# Patient Record
Sex: MALE | Race: WHITE
[De-identification: names, ages, dates, MRNs, and addresses within clinical notes are randomized per-mention and may not be internally consistent; named-entity substitution may affect disease eponyms.]

---

## 2018-05-03 ENCOUNTER — HOSPITAL ENCOUNTER (OUTPATIENT)
Dept: HOSPITAL 84 - D.SP | Age: 28
Discharge: HOME | End: 2018-05-03
Attending: INTERNAL MEDICINE
Payer: MEDICAID

## 2018-05-03 VITALS — HEIGHT: 69 IN | BODY MASS INDEX: 31.61 KG/M2 | BODY MASS INDEX: 31.61 KG/M2 | WEIGHT: 213.45 LBS

## 2018-05-03 VITALS — DIASTOLIC BLOOD PRESSURE: 92 MMHG | SYSTOLIC BLOOD PRESSURE: 171 MMHG

## 2018-05-03 DIAGNOSIS — Z01.812: ICD-10-CM

## 2018-05-03 DIAGNOSIS — N04.9: ICD-10-CM

## 2018-05-03 DIAGNOSIS — R80.9: Primary | ICD-10-CM

## 2018-05-03 LAB
ANION GAP SERPL CALC-SCNC: 10.2 MMOL/L (ref 8–16)
APTT BLD: 28 SECONDS (ref 22.8–39.4)
BASOPHILS NFR BLD AUTO: 0.3 % (ref 0–2)
BUN SERPL-MCNC: 34 MG/DL (ref 7–18)
CALCIUM SERPL-MCNC: 7.5 MG/DL (ref 8.5–10.1)
CHLORIDE SERPL-SCNC: 111 MMOL/L (ref 98–107)
CO2 SERPL-SCNC: 23.6 MMOL/L (ref 21–32)
CREAT SERPL-MCNC: 1.7 MG/DL (ref 0.6–1.3)
EOSINOPHIL NFR BLD: 0.5 % (ref 0–7)
ERYTHROCYTE [DISTWIDTH] IN BLOOD BY AUTOMATED COUNT: 14.3 % (ref 11.5–14.5)
GLUCOSE SERPL-MCNC: 80 MG/DL (ref 74–106)
HCT VFR BLD CALC: 39.8 % (ref 42–54)
HGB BLD-MCNC: 12.9 G/DL (ref 13.5–17.5)
IMM GRANULOCYTES NFR BLD: 0.7 % (ref 0–5)
INR PPP: 0.85 (ref 0.85–1.17)
LYMPHOCYTES NFR BLD AUTO: 28.4 % (ref 15–50)
MCH RBC QN AUTO: 29.4 PG (ref 26–34)
MCHC RBC AUTO-ENTMCNC: 32.4 G/DL (ref 31–37)
MCV RBC: 90.7 FL (ref 80–100)
MONOCYTES NFR BLD: 7.4 % (ref 2–11)
NEUTROPHILS NFR BLD AUTO: 62.7 % (ref 40–80)
OSMOLALITY SERPL CALC.SUM OF ELEC: 287 MOSM/KG (ref 275–300)
PLATELET # BLD: 235 10X3/UL (ref 130–400)
PMV BLD AUTO: 10.3 FL (ref 7.4–10.4)
POTASSIUM SERPL-SCNC: 3.8 MMOL/L (ref 3.5–5.1)
PROTHROMBIN TIME: 11.3 SECONDS (ref 11.6–15)
RBC # BLD AUTO: 4.39 10X6/UL (ref 4.2–6.1)
SODIUM SERPL-SCNC: 141 MMOL/L (ref 136–145)
WBC # BLD AUTO: 10.9 10X3/UL (ref 4.8–10.8)

## 2018-06-13 ENCOUNTER — HOSPITAL ENCOUNTER (OUTPATIENT)
Dept: HOSPITAL 84 - D.SP | Age: 28
LOS: 1 days | Discharge: HOME | End: 2018-06-14
Attending: INTERNAL MEDICINE
Payer: MEDICAID

## 2018-06-13 VITALS
WEIGHT: 187.89 LBS | BODY MASS INDEX: 27.83 KG/M2 | HEIGHT: 69 IN | WEIGHT: 187.89 LBS | BODY MASS INDEX: 27.83 KG/M2 | BODY MASS INDEX: 27.83 KG/M2 | HEIGHT: 69 IN

## 2018-06-13 VITALS — DIASTOLIC BLOOD PRESSURE: 78 MMHG | SYSTOLIC BLOOD PRESSURE: 122 MMHG

## 2018-06-13 VITALS — SYSTOLIC BLOOD PRESSURE: 139 MMHG | DIASTOLIC BLOOD PRESSURE: 81 MMHG

## 2018-06-13 VITALS — SYSTOLIC BLOOD PRESSURE: 134 MMHG | DIASTOLIC BLOOD PRESSURE: 84 MMHG

## 2018-06-13 VITALS — SYSTOLIC BLOOD PRESSURE: 107 MMHG | DIASTOLIC BLOOD PRESSURE: 65 MMHG

## 2018-06-13 VITALS — SYSTOLIC BLOOD PRESSURE: 134 MMHG | DIASTOLIC BLOOD PRESSURE: 80 MMHG

## 2018-06-13 VITALS — SYSTOLIC BLOOD PRESSURE: 130 MMHG | DIASTOLIC BLOOD PRESSURE: 79 MMHG

## 2018-06-13 VITALS — SYSTOLIC BLOOD PRESSURE: 150 MMHG | DIASTOLIC BLOOD PRESSURE: 90 MMHG

## 2018-06-13 VITALS — SYSTOLIC BLOOD PRESSURE: 125 MMHG | DIASTOLIC BLOOD PRESSURE: 72 MMHG

## 2018-06-13 DIAGNOSIS — N04.9: Primary | ICD-10-CM

## 2018-06-13 DIAGNOSIS — I10: ICD-10-CM

## 2018-06-13 DIAGNOSIS — R80.9: ICD-10-CM

## 2018-06-13 DIAGNOSIS — Z01.812: ICD-10-CM

## 2018-06-13 LAB
ANION GAP SERPL CALC-SCNC: 9.6 MMOL/L (ref 8–16)
APTT BLD: 26.8 SECONDS (ref 22.8–39.4)
BASOPHILS NFR BLD AUTO: 0.1 % (ref 0–2)
BASOPHILS NFR BLD AUTO: 0.2 % (ref 0–2)
BASOPHILS NFR BLD AUTO: 0.3 % (ref 0–2)
BUN SERPL-MCNC: 51 MG/DL (ref 7–18)
CALCIUM SERPL-MCNC: 7.7 MG/DL (ref 8.5–10.1)
CHLORIDE SERPL-SCNC: 109 MMOL/L (ref 98–107)
CO2 SERPL-SCNC: 27.2 MMOL/L (ref 21–32)
CREAT SERPL-MCNC: 3 MG/DL (ref 0.6–1.3)
EOSINOPHIL NFR BLD: 0.1 % (ref 0–7)
EOSINOPHIL NFR BLD: 0.3 % (ref 0–7)
EOSINOPHIL NFR BLD: 0.5 % (ref 0–7)
ERYTHROCYTE [DISTWIDTH] IN BLOOD BY AUTOMATED COUNT: 14.5 % (ref 11.5–14.5)
ERYTHROCYTE [DISTWIDTH] IN BLOOD BY AUTOMATED COUNT: 14.5 % (ref 11.5–14.5)
ERYTHROCYTE [DISTWIDTH] IN BLOOD BY AUTOMATED COUNT: 14.8 % (ref 11.5–14.5)
GLUCOSE SERPL-MCNC: 83 MG/DL (ref 74–106)
HCT VFR BLD CALC: 33.6 % (ref 42–54)
HCT VFR BLD CALC: 33.8 % (ref 42–54)
HCT VFR BLD CALC: 36.1 % (ref 42–54)
HGB BLD-MCNC: 10.9 G/DL (ref 13.5–17.5)
HGB BLD-MCNC: 11 G/DL (ref 13.5–17.5)
HGB BLD-MCNC: 11.7 G/DL (ref 13.5–17.5)
IMM GRANULOCYTES NFR BLD: 0.9 % (ref 0–5)
INR PPP: 0.81 (ref 0.85–1.17)
LYMPHOCYTES NFR BLD AUTO: 10 % (ref 15–50)
LYMPHOCYTES NFR BLD AUTO: 20.7 % (ref 15–50)
LYMPHOCYTES NFR BLD AUTO: 27.1 % (ref 15–50)
MCH RBC QN AUTO: 28.9 PG (ref 26–34)
MCH RBC QN AUTO: 29 PG (ref 26–34)
MCH RBC QN AUTO: 29.3 PG (ref 26–34)
MCHC RBC AUTO-ENTMCNC: 32.2 G/DL (ref 31–37)
MCHC RBC AUTO-ENTMCNC: 32.4 G/DL (ref 31–37)
MCHC RBC AUTO-ENTMCNC: 32.7 G/DL (ref 31–37)
MCV RBC: 89.4 FL (ref 80–100)
MCV RBC: 89.4 FL (ref 80–100)
MCV RBC: 89.7 FL (ref 80–100)
MONOCYTES NFR BLD: 3 % (ref 2–11)
MONOCYTES NFR BLD: 7.1 % (ref 2–11)
MONOCYTES NFR BLD: 7.6 % (ref 2–11)
NEUTROPHILS NFR BLD AUTO: 63.6 % (ref 40–80)
NEUTROPHILS NFR BLD AUTO: 70.8 % (ref 40–80)
NEUTROPHILS NFR BLD AUTO: 85.9 % (ref 40–80)
OSMOLALITY SERPL CALC.SUM OF ELEC: 295 MOSM/KG (ref 275–300)
PLATELET # BLD: 230 10X3/UL (ref 130–400)
PLATELET # BLD: 252 10X3/UL (ref 130–400)
PLATELET # BLD: 274 10X3/UL (ref 130–400)
PMV BLD AUTO: 9.4 FL (ref 7.4–10.4)
PMV BLD AUTO: 9.5 FL (ref 7.4–10.4)
PMV BLD AUTO: 9.8 FL (ref 7.4–10.4)
POTASSIUM SERPL-SCNC: 3.8 MMOL/L (ref 3.5–5.1)
PROTHROMBIN TIME: 10.9 SECONDS (ref 11.6–15)
RBC # BLD AUTO: 3.76 10X6/UL (ref 4.2–6.1)
RBC # BLD AUTO: 3.77 10X6/UL (ref 4.2–6.1)
RBC # BLD AUTO: 4.04 10X6/UL (ref 4.2–6.1)
SODIUM SERPL-SCNC: 142 MMOL/L (ref 136–145)
WBC # BLD AUTO: 10.6 10X3/UL (ref 4.8–10.8)
WBC # BLD AUTO: 10.6 10X3/UL (ref 4.8–10.8)
WBC # BLD AUTO: 12.4 10X3/UL (ref 4.8–10.8)

## 2018-06-14 VITALS — SYSTOLIC BLOOD PRESSURE: 117 MMHG | DIASTOLIC BLOOD PRESSURE: 74 MMHG

## 2018-06-14 VITALS — SYSTOLIC BLOOD PRESSURE: 132 MMHG | DIASTOLIC BLOOD PRESSURE: 84 MMHG

## 2018-06-14 LAB
ANION GAP SERPL CALC-SCNC: 7.9 MMOL/L (ref 8–16)
BASOPHILS NFR BLD AUTO: 0.1 % (ref 0–2)
BASOPHILS NFR BLD AUTO: 0.2 % (ref 0–2)
BUN SERPL-MCNC: 56 MG/DL (ref 7–18)
CALCIUM SERPL-MCNC: 7.7 MG/DL (ref 8.5–10.1)
CHLORIDE SERPL-SCNC: 109 MMOL/L (ref 98–107)
CO2 SERPL-SCNC: 27.9 MMOL/L (ref 21–32)
CREAT SERPL-MCNC: 3.2 MG/DL (ref 0.6–1.3)
EOSINOPHIL NFR BLD: 0 % (ref 0–7)
EOSINOPHIL NFR BLD: 0.2 % (ref 0–7)
ERYTHROCYTE [DISTWIDTH] IN BLOOD BY AUTOMATED COUNT: 14.4 % (ref 11.5–14.5)
ERYTHROCYTE [DISTWIDTH] IN BLOOD BY AUTOMATED COUNT: 14.5 % (ref 11.5–14.5)
GLUCOSE SERPL-MCNC: 114 MG/DL (ref 74–106)
HCT VFR BLD CALC: 31.6 % (ref 42–54)
HCT VFR BLD CALC: 33.6 % (ref 42–54)
HGB BLD-MCNC: 10.2 G/DL (ref 13.5–17.5)
HGB BLD-MCNC: 11 G/DL (ref 13.5–17.5)
IMM GRANULOCYTES NFR BLD: 0.6 % (ref 0–5)
IMM GRANULOCYTES NFR BLD: 0.6 % (ref 0–5)
LYMPHOCYTES NFR BLD AUTO: 13.2 % (ref 15–50)
LYMPHOCYTES NFR BLD AUTO: 15.3 % (ref 15–50)
MCH RBC QN AUTO: 29 PG (ref 26–34)
MCH RBC QN AUTO: 29.3 PG (ref 26–34)
MCHC RBC AUTO-ENTMCNC: 32.3 G/DL (ref 31–37)
MCHC RBC AUTO-ENTMCNC: 32.7 G/DL (ref 31–37)
MCV RBC: 89.4 FL (ref 80–100)
MCV RBC: 89.8 FL (ref 80–100)
MONOCYTES NFR BLD: 7 % (ref 2–11)
MONOCYTES NFR BLD: 7.4 % (ref 2–11)
NEUTROPHILS NFR BLD AUTO: 76.4 % (ref 40–80)
NEUTROPHILS NFR BLD AUTO: 79 % (ref 40–80)
OSMOLALITY SERPL CALC.SUM OF ELEC: 295 MOSM/KG (ref 275–300)
PLATELET # BLD: 246 10X3/UL (ref 130–400)
PLATELET # BLD: 249 10X3/UL (ref 130–400)
PMV BLD AUTO: 9.5 FL (ref 7.4–10.4)
PMV BLD AUTO: 9.9 FL (ref 7.4–10.4)
POTASSIUM SERPL-SCNC: 4.8 MMOL/L (ref 3.5–5.1)
RBC # BLD AUTO: 3.52 10X6/UL (ref 4.2–6.1)
RBC # BLD AUTO: 3.76 10X6/UL (ref 4.2–6.1)
SODIUM SERPL-SCNC: 140 MMOL/L (ref 136–145)
WBC # BLD AUTO: 11.1 10X3/UL (ref 4.8–10.8)
WBC # BLD AUTO: 14.3 10X3/UL (ref 4.8–10.8)

## 2019-01-28 ENCOUNTER — HOSPITAL ENCOUNTER (INPATIENT)
Dept: HOSPITAL 84 - D.M2 | Age: 29
LOS: 9 days | Discharge: HOME HEALTH | DRG: 674 | End: 2019-02-06
Attending: INTERNAL MEDICINE | Admitting: INTERNAL MEDICINE
Payer: MEDICAID

## 2019-01-28 VITALS
BODY MASS INDEX: 27.74 KG/M2 | WEIGHT: 187.29 LBS | BODY MASS INDEX: 27.74 KG/M2 | HEIGHT: 69 IN | BODY MASS INDEX: 27.74 KG/M2 | WEIGHT: 187.29 LBS | HEIGHT: 69 IN

## 2019-01-28 DIAGNOSIS — N18.5: ICD-10-CM

## 2019-01-28 DIAGNOSIS — D63.1: ICD-10-CM

## 2019-01-28 DIAGNOSIS — I12.0: Primary | ICD-10-CM

## 2019-01-28 DIAGNOSIS — R33.9: ICD-10-CM

## 2019-01-28 DIAGNOSIS — K42.9: ICD-10-CM

## 2019-01-28 DIAGNOSIS — E83.39: ICD-10-CM

## 2019-01-28 LAB
ALBUMIN SERPL-MCNC: 0.7 G/DL (ref 3.4–5)
ALP SERPL-CCNC: 50 U/L (ref 46–116)
ALT SERPL-CCNC: 26 U/L (ref 10–68)
ANION GAP SERPL CALC-SCNC: 13.8 MMOL/L (ref 8–16)
BASOPHILS NFR BLD AUTO: 0.4 % (ref 0–2)
BILIRUB SERPL-MCNC: 0.27 MG/DL (ref 0.2–1.3)
BUN SERPL-MCNC: 57 MG/DL (ref 7–18)
CALCIUM SERPL-MCNC: 7 MG/DL (ref 8.5–10.1)
CHLORIDE SERPL-SCNC: 109 MMOL/L (ref 98–107)
CO2 SERPL-SCNC: 22.9 MMOL/L (ref 21–32)
CREAT SERPL-MCNC: 5.2 MG/DL (ref 0.6–1.3)
EOSINOPHIL NFR BLD: 0.3 % (ref 0–7)
ERYTHROCYTE [DISTWIDTH] IN BLOOD BY AUTOMATED COUNT: 13.9 % (ref 11.5–14.5)
GLOBULIN SER-MCNC: 3.3 G/L
GLUCOSE SERPL-MCNC: 106 MG/DL (ref 74–106)
HCT VFR BLD CALC: 34.4 % (ref 42–54)
HGB BLD-MCNC: 11 G/DL (ref 13.5–17.5)
IMM GRANULOCYTES NFR BLD: 0.5 % (ref 0–5)
INR PPP: 0.83 (ref 0.85–1.17)
LYMPHOCYTES NFR BLD AUTO: 12.4 % (ref 15–50)
MCH RBC QN AUTO: 29.7 PG (ref 26–34)
MCHC RBC AUTO-ENTMCNC: 32 G/DL (ref 31–37)
MCV RBC: 93 FL (ref 80–100)
MONOCYTES NFR BLD: 5.1 % (ref 2–11)
NEUTROPHILS NFR BLD AUTO: 81.3 % (ref 40–80)
OSMOLALITY SERPL CALC.SUM OF ELEC: 296 MOSM/KG (ref 275–300)
PLATELET # BLD: 210 10X3/UL (ref 130–400)
PMV BLD AUTO: 9.4 FL (ref 7.4–10.4)
POTASSIUM SERPL-SCNC: 4.7 MMOL/L (ref 3.5–5.1)
PROT SERPL-MCNC: 4 G/DL (ref 6.4–8.2)
PROTHROMBIN TIME: 11 SECONDS (ref 11.6–15)
RBC # BLD AUTO: 3.7 10X6/UL (ref 4.2–6.1)
SODIUM SERPL-SCNC: 141 MMOL/L (ref 136–145)
WBC # BLD AUTO: 8 10X3/UL (ref 4.8–10.8)

## 2019-01-29 VITALS — DIASTOLIC BLOOD PRESSURE: 84 MMHG | SYSTOLIC BLOOD PRESSURE: 136 MMHG

## 2019-01-29 VITALS — DIASTOLIC BLOOD PRESSURE: 83 MMHG | SYSTOLIC BLOOD PRESSURE: 128 MMHG

## 2019-01-29 VITALS — DIASTOLIC BLOOD PRESSURE: 92 MMHG | SYSTOLIC BLOOD PRESSURE: 144 MMHG

## 2019-01-29 VITALS — DIASTOLIC BLOOD PRESSURE: 89 MMHG | SYSTOLIC BLOOD PRESSURE: 129 MMHG

## 2019-01-29 VITALS — SYSTOLIC BLOOD PRESSURE: 140 MMHG | DIASTOLIC BLOOD PRESSURE: 94 MMHG

## 2019-01-29 VITALS — DIASTOLIC BLOOD PRESSURE: 88 MMHG | SYSTOLIC BLOOD PRESSURE: 123 MMHG

## 2019-01-29 VITALS — DIASTOLIC BLOOD PRESSURE: 77 MMHG | SYSTOLIC BLOOD PRESSURE: 126 MMHG

## 2019-01-29 PROCEDURE — 0WUF0JZ SUPPLEMENT ABDOMINAL WALL WITH SYNTHETIC SUBSTITUTE, OPEN APPROACH: ICD-10-PCS | Performed by: SURGERY

## 2019-01-29 PROCEDURE — 03180ZF BYPASS LEFT BRACHIAL ARTERY TO LOWER ARM VEIN, OPEN APPROACH: ICD-10-PCS | Performed by: SURGERY

## 2019-01-29 NOTE — NUR
RECEIVED REPORT ON PATIENT WHO IS NPO FOR SURGERY THIS AM. DR MEMBRENO IN TO
SEE PATIENT. NEW ORDERS RECEIVED. PATIENT HAS BEEN NPO SINCE MIDNIGHT. ON ROOM
AIR. LEFT FA PIV SEEN WITH SALINE LOCK. PARENTS AT BEDSIDE. WILL MONITOR.

## 2019-01-29 NOTE — NUR
PATIENT RESTING IN BED. RESPIRATIONS ARE EVEN AND UNLABORED. NO S/S OF
DISTRESS. NO C/O PAIN. CALL LIGHT WITHIN REACH. WILL CPOC.

## 2019-01-29 NOTE — NUR
RECEIVED BACK TO ROOM WITH SLING TO LEFT ARM. COMPRESSED JACQUES DRAIN SEEN TO
ABDOMINAL AREA. IV OF NS INFUSING PER GRAVITY TO RIGHT HAND.

## 2019-01-29 NOTE — NUR
AS PER DR MEMBRENO NOTE, PATIENT IS TO RESUME HIS LOVENOX PRIOR TO ADMISSION
BUT I DO NOT SEE IT ON THE [facility] RECONCILATION LIST. PATIENT WAS TAKING
HEPARIN AT HOME AND THIS WAS VERIFIED BY MYSELF WITH THE PATIENT AND THE
PARENTS.

## 2019-01-29 NOTE — NUR
WE ARE TO CONTINUE HOME MEDS BUT HOLD THE POTASSIUM AND METOLZONE AND XARELTO
FOR NOW PER LAZARUS BULL.

## 2019-01-29 NOTE — NUR
RESUMING CARE. PT LAYING IN BED, IV RT HAND SL , LEFT AVF DONE TODAY RESERVED
LEFT ARM PT HAD OPEN UMBILICAL HERNIA JACQUES DRAIN IN PLACE PT C/O OF PAIN IN
ABDOMEN ADVISED PT WE WOULD CALL TO GET SOMETHING FOR PAIN CL IN REACH WILL
CONT TO MONITOR

## 2019-01-30 VITALS — SYSTOLIC BLOOD PRESSURE: 138 MMHG | DIASTOLIC BLOOD PRESSURE: 96 MMHG

## 2019-01-30 VITALS — DIASTOLIC BLOOD PRESSURE: 99 MMHG | SYSTOLIC BLOOD PRESSURE: 144 MMHG

## 2019-01-30 VITALS — DIASTOLIC BLOOD PRESSURE: 81 MMHG | SYSTOLIC BLOOD PRESSURE: 134 MMHG

## 2019-01-30 VITALS — SYSTOLIC BLOOD PRESSURE: 143 MMHG | DIASTOLIC BLOOD PRESSURE: 91 MMHG

## 2019-01-30 VITALS — SYSTOLIC BLOOD PRESSURE: 130 MMHG | DIASTOLIC BLOOD PRESSURE: 84 MMHG

## 2019-01-30 VITALS — DIASTOLIC BLOOD PRESSURE: 99 MMHG | SYSTOLIC BLOOD PRESSURE: 151 MMHG

## 2019-01-30 NOTE — NUR
CALLED TO ROOM WITH COMPLAINT OF NOT VOIDING. BLADDER SCAN DONE WITH RESIDUAL
 ML. CALL PLACED TO LAZARUS BULL FOR ANY FURTHER ORDERS.

## 2019-01-30 NOTE — NUR
PT HAS NOT VOIDING SINCE SURGERY , HAD GARCÍA MIJARES PAGED, I ADVISED I BLADDER
SCANNED PT AND HE WAS HOLDING 877ML SE ADVISED TO DO AN IN AND OUT CATH. AND
GIVE PTS 0900 FLOMAX, IN AND OUT DONE FLOMAX GIVEN TOLERATED WELL WILL CONT
TO MONITOR

## 2019-01-30 NOTE — NUR
RESUMING PATIENT CARE. PATIENT IS ALERT AND ORIENTED. PATIENT IS STANDING AT
SIDE OF BED. DENIES NEEDS. NO S/S OF DISTRESS. NO C/O PAIN. CALL LIGHT WITHIN
REACH. WILL CPOC.

## 2019-01-30 NOTE — NUR
AVTAR NI RN CHARGE TO SPEAK TO LAZARUS HULL R/T PATIENT NOT VOIDING
YET. NO NEW FURTHER ORDERS AT THIS TIME.

## 2019-01-30 NOTE — NUR
ROUNDING DONE WITH PATIENT INFORMED THAT HE IS NEEDING TO GET OUT OF BED FOR
BREAKFAST THIS AM. LEFT ARM IS IN SLING AT THIS TIME. JACQUES DRAIN SEEN TO LEFT
ABDOMINAL AREA WITH C/D/I DRESSING TO UMBILICAL AREA. DRAIN IS COMPRESSED.
FAMILY AT BEDSIDE. SALINE LOCK PIV SEEN TO RIGHT HAND.

## 2019-01-31 VITALS — SYSTOLIC BLOOD PRESSURE: 130 MMHG | DIASTOLIC BLOOD PRESSURE: 75 MMHG

## 2019-01-31 VITALS — DIASTOLIC BLOOD PRESSURE: 74 MMHG | SYSTOLIC BLOOD PRESSURE: 128 MMHG

## 2019-01-31 VITALS — DIASTOLIC BLOOD PRESSURE: 72 MMHG | SYSTOLIC BLOOD PRESSURE: 141 MMHG

## 2019-01-31 VITALS — DIASTOLIC BLOOD PRESSURE: 54 MMHG | SYSTOLIC BLOOD PRESSURE: 142 MMHG

## 2019-01-31 VITALS — SYSTOLIC BLOOD PRESSURE: 121 MMHG | DIASTOLIC BLOOD PRESSURE: 80 MMHG

## 2019-01-31 VITALS — DIASTOLIC BLOOD PRESSURE: 90 MMHG | SYSTOLIC BLOOD PRESSURE: 148 MMHG

## 2019-01-31 VITALS — DIASTOLIC BLOOD PRESSURE: 79 MMHG | SYSTOLIC BLOOD PRESSURE: 132 MMHG

## 2019-01-31 LAB
ALBUMIN SERPL-MCNC: 0.9 G/DL (ref 3.4–5)
ALP SERPL-CCNC: 48 U/L (ref 46–116)
ALT SERPL-CCNC: 13 U/L (ref 10–68)
ANION GAP SERPL CALC-SCNC: 17.5 MMOL/L (ref 8–16)
BASOPHILS NFR BLD AUTO: 0.3 % (ref 0–2)
BILIRUB SERPL-MCNC: 0.23 MG/DL (ref 0.2–1.3)
BUN SERPL-MCNC: 76 MG/DL (ref 7–18)
CALCIUM SERPL-MCNC: 7.7 MG/DL (ref 8.5–10.1)
CHLORIDE SERPL-SCNC: 106 MMOL/L (ref 98–107)
CO2 SERPL-SCNC: 19.9 MMOL/L (ref 21–32)
CREAT SERPL-MCNC: 7 MG/DL (ref 0.6–1.3)
EOSINOPHIL NFR BLD: 0.5 % (ref 0–7)
ERYTHROCYTE [DISTWIDTH] IN BLOOD BY AUTOMATED COUNT: 14.5 % (ref 11.5–14.5)
GLOBULIN SER-MCNC: 3.6 G/L
GLUCOSE SERPL-MCNC: 94 MG/DL (ref 74–106)
HCT VFR BLD CALC: 35.3 % (ref 42–54)
HGB BLD-MCNC: 11 G/DL (ref 13.5–17.5)
IMM GRANULOCYTES NFR BLD: 1.1 % (ref 0–5)
LYMPHOCYTES NFR BLD AUTO: 13.1 % (ref 15–50)
MAGNESIUM SERPL-MCNC: 3.1 MG/DL (ref 1.8–2.4)
MCH RBC QN AUTO: 29.5 PG (ref 26–34)
MCHC RBC AUTO-ENTMCNC: 31.2 G/DL (ref 31–37)
MCV RBC: 94.6 FL (ref 80–100)
MONOCYTES NFR BLD: 7 % (ref 2–11)
NEUTROPHILS NFR BLD AUTO: 78 % (ref 40–80)
OSMOLALITY SERPL CALC.SUM OF ELEC: 300 MOSM/KG (ref 275–300)
PHOSPHATE SERPL-MCNC: 10.8 MG/DL (ref 2.5–4.9)
PLATELET # BLD: 222 10X3/UL (ref 130–400)
PMV BLD AUTO: 9.8 FL (ref 7.4–10.4)
POTASSIUM SERPL-SCNC: 4.4 MMOL/L (ref 3.5–5.1)
PROT SERPL-MCNC: 4.5 G/DL (ref 6.4–8.2)
RBC # BLD AUTO: 3.73 10X6/UL (ref 4.2–6.1)
SODIUM SERPL-SCNC: 139 MMOL/L (ref 136–145)
WBC # BLD AUTO: 11.3 10X3/UL (ref 4.8–10.8)

## 2019-01-31 NOTE — NUR
ASSESSMENT COMPLETED. ALERT AND ORIENTED. LEFT ARM RESERVED.  RIGHT HAND SL.
DRSG TO LOWER ABD WITH DRAIN. MARSH CATH TO GRAVITY. UP AB [facility]IER.WILL MONITOR

## 2019-01-31 NOTE — MORECARE
CASE MANAGEMENT DISCHARGE SUMMARY
 
 
PATIENT: MARU ASHBY              UNIT: [id number]
ACCOUNT#: [id number]                       ADM DATE: 19
AGE: 28     : [birth year]  SEX: M            ROOM/BED: D.2132    
AUTHOR: MAYELIN CRUZ                             PHYSICIAN:                               
 
REFERRING PHYSICIAN: VINICIUS BETHEA MD                  
DATE OF SERVICE: 19
Discharge Plan
 
 
Patient Name: MARU ASHBY
Facility: [facility]:[place]
Encounter #: [id number]
Medical Record #: [id number]
: 1990
Planned Disposition: Home with Home Health
Anticipated Discharge Date: 19
 
Discharge Date: 
Expected LOS: 4
Initial Reviewer: [id number]
Initial Review Date: 2019
Generated: 19   6:30 pm 
Comments
 
DCP- Discharge Planning
 
Updated by [email or web address]: Carl Bojorquez on 19   4:22 pm CT
Patient Name: MARU ASHBY                                     
Admission Status: Elective   
Accout number: [id number]                              
Admission Date: 2019   
: 1990                                                        
Admission Diagnosis:   
Attending: VINICIUS BETHEA                                                
Current LOS:  3   
  
Anticipated DC Date: 2019   
Planned Disposition: Home with Home Health   
Primary Insurance: [employer] [place] PRIVATE OPTIONS COD[place]   
PLANNED EXTERNAL PROVIDER:  [facility]  
  
Discharge Planning Comments:   
CM RECEIVED HOME HEALTH ORDER, MET WITH PT IN ROOM TO DISCUSS DISCHARGE 
PLANNING AND NEEDS.  PT REPORTS LIVING AT HOME INDEPENDENTLY WITH PARENTS AND 
ADULT SISTER.   PT HAS BLOOD PRESSURE MONITOR AND NO MEDICAL EQUIPMENT  
PROVIDER PREFERENCE.  PT HAS NO OUTSIDE SERVICES ASSISTING IN THE HOME. PT IS 
 
NOT YET DOING OUTPATIENT DIALYSIS, BUT HAD A FISTULA  CREATED FOR WHEN HE MAY 
NEED IT.  CM DISCUSSED AVAILABILITY OF HOME HEALTH, REHAB SERVICES AND 
MEDICAL EQUIPMENT. PT WILL ACCEPT HOME HEALTH, REPORTS H IS SISTER TO BE A 
TEACHABLE CAREGIVER IN HOME.  PT REPORTS HIS PARENTS WILL PICK HIM UP FOR 
DISCHARGE HOME TOMORROW. PT HAS NO PREFERENCE ON HOME HEALTH PROVIDER, CHOICE 
LETTER SIGNED.  PT DENIES FURTHER DISCHARGE NEEDS.  
  
CM SPOKE TO OCTAVIANO OF [facility], DISCUSSED REFERRAL.  OCTAVIANO THINKS PT MAY BE WITHIN 
THE SERVICE AREA FOR [facility].  CM CALLED [facility], [phone], SPOKE TO VENTURA 
AND PROVIDED REFERRAL INFORMATION.  CM FAXED REFERRAL FOR HOME HEALTH TO [facility] [phone].  
  
CM WAITING ACCEPTANCE DETERMINATION FROM [facility].  
  
: Carl Bojorquez  
  
Appended by Carl Bojorquez on 2019 17:22 CST:  
CM RECEIVED CALL FROM VENTURA OF [facility], THEY WILL ACCEPT PT AND 
PLAN FOR HOME HEALTH ADMISSION ON SATURDAY, 19.  
  
FOR DISCHARGE, NOTIFY [facility] [phone].  FAX DISCHARGE 
INFORMATION TO [facility] [phone].   
  
CARL BOJORQUEZ, CASE MANAGEMENT
 DCPIA - Discharge Planning Initial Assessment
 
Updated by [id number]: Carl Bojorquez on 19   1:37 pm
*  Is the patient Alert and Oriented?
Yes
*  How many steps to enter\exit or inside your home? NONE *  PCP VINICIUS BETHEA *  Pharmacy
[facility]
*  Preadmission Environment
Home with Family
*  ADLs
Independent
*  Equipment
Other
*  Other Equipment
BLOOD PRESSURE CUFF  NO MEDICAL EQUIPMENT PROVIDER PREFERENCE
 
*  List name and contact numbers for known caregivers / representatives who 
currently or will assist patient after discharge:
BAR ASHBY, MOTHER, [phone]
*  Verbal permission to speak to the caregivers and representatives has been 
obtained from the patient.
Yes
*  Community resources currently utilized
None
*  Please name any agencies selected above.
NONE
*  Additional services required to return to the preadmission environment?
Yes
 
*  Can the patient safely return to the preadmission environment?
Yes
*  Has this patient been hospitalized within the prior 30 days at any 
hospital?
No
 
 
 
 
 
Coverage Notice
 
Reviewer: [email or web address] - Carl Bojorquez
 
Notice Issued Date-Time: 2019  11:20
Notice Type: Patient Choice Letter
 
Notice Delivered To: Patient
Relationship to Patient: 
Representative Name: 
 
Delivery Method: HAND - Hand Delivered
Minnie Days:
Prior Verbal Notification: 
 
Recipient Understood Notice: Yes
Recipient Signature: Yes
Med Rec Note Co-signed by Attending:
 
Coverage Notice Comment:  NO HOME HEALTH PREFERENCE
 
Last DP export: 19  12:55 p
Patient Name: MARU ASHBY
Encounter #: [id number]
Page [id number]
 
 
 
 
 
Electronically Signed by MAYELIN CRUZ on 19 at 1730
 
 
 
 
 
 
**All edits/amendments must be made on the electronic document**
 
DICTATION DATE: 19     : JUWAN  19     
RPT#: [id number]                                DC DATE:        
                                               STATUS: ADM IN  
[facility]
[address] [place] [address]
***END OF REPORT***

## 2019-01-31 NOTE — NUR
RESUMING PATIENT CARE. PATIENT IS ALERT AND ORIENTED. PATIENT UP WALKING.
RESPIRATIONS ARE EVEN AND UNLABORED. DENIES NEEDS. NO S/S OF DISTRESS. NO C/O
PAIN. CALL LIGHT WITHIN REACH. WILL CPOC.

## 2019-01-31 NOTE — MORECARE
CASE MANAGEMENT DISCHARGE SUMMARY
 
 
PATIENT: MARU ASHBY              UNIT: [id number]
ACCOUNT#: [id number]                       ADM DATE: 19
AGE: 28     : [birth year]  SEX: M            ROOM/BED: D.2131    
AUTHOR: MAYELIN CRUZ                             PHYSICIAN:                               
 
REFERRING PHYSICIAN: VINICIUS BETHEA MD                  
DATE OF SERVICE: 19
Discharge Plan
 
 
Patient Name: MARU ASHBY
Facility: [facility]:[place]
Encounter #: [id number]
Medical Record #: [id number]
: 1990
Planned Disposition: Home with Home Health
Anticipated Discharge Date: 19
 
Discharge Date: 
Expected LOS: 4
Initial Reviewer: [email or web address]
Initial Review Date: 2019
Generated: 19   2:40 pm 
 DCPIA - Discharge Planning Initial Assessment
 
Updated by [id number]: Carl Willams on 19   1:37 pm
*  Is the patient Alert and Oriented?
Yes
*  How many steps to enter\exit or inside your home? NONE *  PCP VINICIUS BETHEA *  Pharmacy
[facility]
*  Preadmission Environment
Home with Family
*  ADLs
Independent
*  Equipment
Other
*  Other Equipment
BLOOD PRESSURE CUFF  NO MEDICAL EQUIPMENT PROVIDER PREFERENCE
 
*  List name and contact numbers for known caregivers / representatives who 
currently or will assist patient after discharge:
BAR ASHBY, MOTHER, [phone]
*  Verbal permission to speak to the caregivers and representatives has been 
obtained from the patient.
Yes
*  Community resources currently utilized
None
 
*  Please name any agencies selected above.
NONE
*  Additional services required to return to the preadmission environment?
Yes
*  Can the patient safely return to the preadmission environment?
Yes
*  Has this patient been hospitalized within the prior 30 days at any 
hospital?
No
 
External Providers
External Provider: [facility]
 
Next Contact Date: 2019
Service Request Date: 
Service Type: 
Resolution: 
 
Reviewer: 
Comments: 
 
 
 
 
Coverage Notice
 
Reviewer: [email or web address] - Carl Willams
 
Notice Issued Date-Time: 2019  11:20
Notice Type: Patient Choice Letter
 
Notice Delivered To: Patient
Relationship to Patient: 
Representative Name: 
 
Delivery Method: HAND - Hand Delivered
Minnie Days:
Prior Verbal Notification: 
 
Recipient Understood Notice: Yes
Recipient Signature: Yes
Med Rec Note Co-signed by Attending:
 
Coverage Notice Comment:  NO HOME HEALTH PREFERENCE
Patient Name: MARU ASHBY
 
Encounter #: [id number]
Page [id number]
 
 
 
 
 
Electronically Signed by MAYELIN CRUZ on 19 at 1340
 
 
 
 
 
 
**All edits/amendments must be made on the electronic document**
 
DICTATION DATE: 19 1340     : JUWAN  19 1340     
RPT#: [id number]                                DC DATE:        
                                               STATUS: ADM IN  
[facility]
 [place] [address]
***END OF REPORT***

## 2019-01-31 NOTE — MORECARE
CASE MANAGEMENT DISCHARGE SUMMARY
 
 
PATIENT: MARU ASHBY              UNIT: [id number]
ACCOUNT#: [id number]                       ADM DATE: 19
AGE: 28     : [birth year]  SEX: M            ROOM/BED: D.2134    
AUTHOR: MAYELIN CRUZ                             PHYSICIAN:                               
 
REFERRING PHYSICIAN: VINICIUS BETHEA MD                  
DATE OF SERVICE: 19
Discharge Plan
 
 
Patient Name: MARU ASHBY
Facility: [facility]:[place]
Encounter #: [id number]
Medical Record #: [id number]
: 1990
Planned Disposition: Home with Home Health
Anticipated Discharge Date: 19
 
Discharge Date: 
Expected LOS: 4
Initial Reviewer: [email or web address]
Initial Review Date: 2019
Generated: 19   2:55 pm 
Comments
 
DCP- Discharge Planning
 
Updated by [id number]: Carl Willams on 19  12:51 pm CT
Patient Name: MARU ASHBY                                     
Admission Status: Elective   
Accout number: [id number]                              
Admission Date: 2019   
: 1990                                                        
Admission Diagnosis:   
Attending: VINICIUS BETHEA                                                
Current LOS:  3   
  
Anticipated DC Date: 2019   
Planned Disposition: Home with Home Health   
Primary Insurance: [employer] [place] PRIVATE OPTIONS COD[place]   
PLANNED EXTERNAL PROVIDER:  [facility]  
  
Discharge Planning Comments:   
CM RECEIVED HOME HEALTH ORDER, MET WITH PT IN ROOM TO DISCUSS DISCHARGE 
PLANNING AND NEEDS.  PT REPORTS LIVING AT HOME INDEPENDENTLY WITH PARENTS AND 
ADULT SISTER.   PT HAS BLOOD PRESSURE MONITOR AND NO MEDICAL EQUIPMENT  
PROVIDER PREFERENCE.  PT HAS NO OUTSIDE SERVICES ASSISTING IN THE HOME. PT IS 
 
NOT YET DOING OUTPATIENT DIALYSIS, BUT HAD A FISTULA  CREATED FOR WHEN HE MAY 
NEED IT.  CM DISCUSSED AVAILABILITY OF HOME HEALTH, REHAB SERVICES AND 
MEDICAL EQUIPMENT. PT WILL ACCEPT HOME HEALTH, REPORTS H IS SISTER TO BE A 
TEACHABLE CAREGIVER IN HOME.  PT REPORTS HIS PARENTS WILL PICK HIM UP FOR 
DISCHARGE HOME TOMORROW. PT HAS NO PREFERENCE ON HOME HEALTH PROVIDER, CHOICE 
LETTER SIGNED.  PT DENIES FURTHER DISCHARGE NEEDS.  
  
CM SPOKE TO OCTAVIANO OF [facility], DISCUSSED REFERRAL.  OCTAVIANO THINKS PT MAY BE WITHIN 
THE SERVICE AREA FOR [facility].  CM CALLED [facility], [phone], SPOKE TO VENTURA 
AND PROVIDED REFERRAL INFORMATION.  CM FAXED REFERRAL FOR HOME HEALTH TO [facility] [phone].  
  
CM WAITING ACCEPTANCE DETERMINATION FROM [facility].  
  
: Carl Willams
 DCPIA - Discharge Planning Initial Assessment
 
Updated by [id number]: Carl Willams on 19   1:37 pm
*  Is the patient Alert and Oriented?
Yes
*  How many steps to enter\exit or inside your home? NONE *  PCP VINICIUS BETHEA *  Pharmacy
[facility]
*  Preadmission Environment
Home with Family
*  ADLs
Independent
*  Equipment
Other
*  Other Equipment
BLOOD PRESSURE CUFF  NO MEDICAL EQUIPMENT PROVIDER PREFERENCE
 
*  List name and contact numbers for known caregivers / representatives who 
currently or will assist patient after discharge:
BAR ASHBY, MOTHER, [phone]
*  Verbal permission to speak to the caregivers and representatives has been 
obtained from the patient.
Yes
*  Community resources currently utilized
None
*  Please name any agencies selected above.
NONE
*  Additional services required to return to the preadmission environment?
Yes
*  Can the patient safely return to the preadmission environment?
Yes
*  Has this patient been hospitalized within the prior 30 days at any 
hospital?
No
 
 
 
 
 
 
Coverage Notice
 
Reviewer: [email or web address] - Carl Willams
 
Notice Issued Date-Time: 2019  11:20
Notice Type: Patient Choice Letter
 
Notice Delivered To: Patient
Relationship to Patient: 
Representative Name: 
 
Delivery Method: HAND - Hand Delivered
Minnie Days:
Prior Verbal Notification: 
 
Recipient Understood Notice: Yes
Recipient Signature: Yes
Med Rec Note Co-signed by Attending:
 
Coverage Notice Comment:  NO HOME HEALTH PREFERENCE
 
Last DP export: 19  12:40 p
Patient Name: MARU ASHBY
Encounter #: [id number]
Page [phone]
 
 
 
 
 
Electronically Signed by MAYELIN CRUZ on 19 at 1355
 
 
 
 
 
 
**All edits/amendments must be made on the electronic document**
 
DICTATION DATE: 19 1355     : JUWAN  19 1355     
RPT#: [id number]                                DC DATE:        
                                               STATUS: ADM IN  
[facility]
[address] [place] [address]
***END OF REPORT***

## 2019-02-01 VITALS — DIASTOLIC BLOOD PRESSURE: 78 MMHG | SYSTOLIC BLOOD PRESSURE: 125 MMHG

## 2019-02-01 VITALS — SYSTOLIC BLOOD PRESSURE: 133 MMHG | DIASTOLIC BLOOD PRESSURE: 89 MMHG

## 2019-02-01 VITALS — DIASTOLIC BLOOD PRESSURE: 77 MMHG | SYSTOLIC BLOOD PRESSURE: 133 MMHG

## 2019-02-01 VITALS — DIASTOLIC BLOOD PRESSURE: 81 MMHG | SYSTOLIC BLOOD PRESSURE: 142 MMHG

## 2019-02-01 VITALS — SYSTOLIC BLOOD PRESSURE: 126 MMHG | DIASTOLIC BLOOD PRESSURE: 73 MMHG

## 2019-02-01 LAB
ALBUMIN SERPL-MCNC: 0.8 G/DL (ref 3.4–5)
ALP SERPL-CCNC: 38 U/L (ref 46–116)
ALT SERPL-CCNC: 9 U/L (ref 10–68)
ANION GAP SERPL CALC-SCNC: 18.4 MMOL/L (ref 8–16)
ANION GAP SERPL CALC-SCNC: 18.5 MMOL/L (ref 8–16)
BASOPHILS NFR BLD AUTO: 0.2 % (ref 0–2)
BILIRUB SERPL-MCNC: 0.26 MG/DL (ref 0.2–1.3)
BUN SERPL-MCNC: 82 MG/DL (ref 7–18)
BUN SERPL-MCNC: 84 MG/DL (ref 7–18)
CALCIUM SERPL-MCNC: 6.6 MG/DL (ref 8.5–10.1)
CALCIUM SERPL-MCNC: 6.9 MG/DL (ref 8.5–10.1)
CHLORIDE SERPL-SCNC: 104 MMOL/L (ref 98–107)
CHLORIDE SERPL-SCNC: 105 MMOL/L (ref 98–107)
CO2 SERPL-SCNC: 17.8 MMOL/L (ref 21–32)
CO2 SERPL-SCNC: 18.3 MMOL/L (ref 21–32)
CREAT SERPL-MCNC: 8.3 MG/DL (ref 0.6–1.3)
CREAT SERPL-MCNC: 8.3 MG/DL (ref 0.6–1.3)
EOSINOPHIL NFR BLD: 1 % (ref 0–7)
ERYTHROCYTE [DISTWIDTH] IN BLOOD BY AUTOMATED COUNT: 13.3 % (ref 11.5–14.5)
ERYTHROCYTE [DISTWIDTH] IN BLOOD BY AUTOMATED COUNT: 14.3 % (ref 11.5–14.5)
GLOBULIN SER-MCNC: 3.1 G/L
GLUCOSE SERPL-MCNC: 103 MG/DL (ref 74–106)
GLUCOSE SERPL-MCNC: 75 MG/DL (ref 74–106)
HCT VFR BLD CALC: 27.4 % (ref 42–54)
HCT VFR BLD CALC: 27.7 % (ref 42–54)
HGB BLD-MCNC: 8.8 G/DL (ref 13.5–17.5)
HGB BLD-MCNC: 9.3 G/DL (ref 13.5–17.5)
IMM GRANULOCYTES NFR BLD: 1 % (ref 0–5)
LYMPHOCYTES NFR BLD AUTO: 6 % (ref 15–50)
LYMPHOCYTES NFR BLD AUTO: 9.6 % (ref 15–50)
MCH RBC QN AUTO: 29.2 PG (ref 26–34)
MCH RBC QN AUTO: 30.6 PG (ref 26–34)
MCHC RBC AUTO-ENTMCNC: 31.8 G/DL (ref 31–37)
MCHC RBC AUTO-ENTMCNC: 33.9 G/DL (ref 31–37)
MCV RBC: 90.1 FL (ref 80–100)
MCV RBC: 92 FL (ref 80–100)
MONOCYTES NFR BLD: 9.6 % (ref 2–11)
NEUTROPHILS NFR BLD AUTO: 78.6 % (ref 40–80)
NEUTROPHILS NFR BLD AUTO: 82.9 % (ref 40–80)
OSMOLALITY SERPL CALC.SUM OF ELEC: 298 MOSM/KG (ref 275–300)
OSMOLALITY SERPL CALC.SUM OF ELEC: 298 MOSM/KG (ref 275–300)
PHOSPHATE SERPL-MCNC: 10.9 MG/DL (ref 2.5–4.9)
PLATELET # BLD: 150 10X3/UL (ref 130–400)
PLATELET # BLD: 154 10X3/UL (ref 130–400)
PMV BLD AUTO: 9.1 FL (ref 7.4–10.4)
PMV BLD AUTO: 9.7 FL (ref 7.4–10.4)
POTASSIUM SERPL-SCNC: 3.8 MMOL/L (ref 3.5–5.1)
POTASSIUM SERPL-SCNC: 4.2 MMOL/L (ref 3.5–5.1)
PROT SERPL-MCNC: 3.9 G/DL (ref 6.4–8.2)
RBC # BLD AUTO: 3.01 10X6/UL (ref 4.2–6.1)
RBC # BLD AUTO: 3.04 10X6/UL (ref 4.2–6.1)
SODIUM SERPL-SCNC: 137 MMOL/L (ref 136–145)
SODIUM SERPL-SCNC: 137 MMOL/L (ref 136–145)
WBC # BLD AUTO: 6.7 10X3/UL (ref 4.8–10.8)
WBC # BLD AUTO: 8 10X3/UL (ref 4.8–10.8)

## 2019-02-01 NOTE — NUR
RESUMING PT CARE, PT LAYING IN BED ALERT AND ORIENTED X3, STATES PAIN IS AT A
8 ON PAIN SCALE, PAIN MED GIVEN. RICK ZULUAGA AT BEDSIDE AND TOLD PT
THAT HIS RENAL FUNCTIONS ARE WORSE AND WILL NEED TO START DIALYSIS. CALL LIGHT
IN REACH, WILL CONTINUE TO MONITOR AND FOLLOW PLAN OF CARE.

## 2019-02-02 VITALS — DIASTOLIC BLOOD PRESSURE: 80 MMHG | SYSTOLIC BLOOD PRESSURE: 131 MMHG

## 2019-02-02 VITALS — DIASTOLIC BLOOD PRESSURE: 78 MMHG | SYSTOLIC BLOOD PRESSURE: 126 MMHG

## 2019-02-02 VITALS — DIASTOLIC BLOOD PRESSURE: 65 MMHG | SYSTOLIC BLOOD PRESSURE: 134 MMHG

## 2019-02-02 LAB
ALBUMIN SERPL-MCNC: 0.7 G/DL (ref 3.4–5)
ALP SERPL-CCNC: 45 U/L (ref 46–116)
ALT SERPL-CCNC: 7 U/L (ref 10–68)
ANION GAP SERPL CALC-SCNC: 19.6 MMOL/L (ref 8–16)
BASOPHILS NFR BLD AUTO: 0.1 % (ref 0–2)
BILIRUB SERPL-MCNC: 0.23 MG/DL (ref 0.2–1.3)
BUN SERPL-MCNC: 89 MG/DL (ref 7–18)
CALCIUM SERPL-MCNC: 6.9 MG/DL (ref 8.5–10.1)
CHLORIDE SERPL-SCNC: 104 MMOL/L (ref 98–107)
CO2 SERPL-SCNC: 18.5 MMOL/L (ref 21–32)
CREAT SERPL-MCNC: 8.7 MG/DL (ref 0.6–1.3)
EOSINOPHIL NFR BLD: 0.7 % (ref 0–7)
ERYTHROCYTE [DISTWIDTH] IN BLOOD BY AUTOMATED COUNT: 13.6 % (ref 11.5–14.5)
GLOBULIN SER-MCNC: 3.3 G/L
GLUCOSE SERPL-MCNC: 90 MG/DL (ref 74–106)
HCT VFR BLD CALC: 27.6 % (ref 42–54)
HGB BLD-MCNC: 9 G/DL (ref 13.5–17.5)
IMM GRANULOCYTES NFR BLD: 0.8 % (ref 0–5)
LYMPHOCYTES NFR BLD AUTO: 9 % (ref 15–50)
MCH RBC QN AUTO: 29.6 PG (ref 26–34)
MCHC RBC AUTO-ENTMCNC: 32.6 G/DL (ref 31–37)
MCV RBC: 90.8 FL (ref 80–100)
MONOCYTES NFR BLD: 8.5 % (ref 2–11)
NEUTROPHILS NFR BLD AUTO: 80.9 % (ref 40–80)
OSMOLALITY SERPL CALC.SUM OF ELEC: 302 MOSM/KG (ref 275–300)
PLATELET # BLD: 170 10X3/UL (ref 130–400)
PMV BLD AUTO: 10.1 FL (ref 7.4–10.4)
POTASSIUM SERPL-SCNC: 4.1 MMOL/L (ref 3.5–5.1)
PROT SERPL-MCNC: 4 G/DL (ref 6.4–8.2)
RBC # BLD AUTO: 3.04 10X6/UL (ref 4.2–6.1)
SODIUM SERPL-SCNC: 138 MMOL/L (ref 136–145)
WBC # BLD AUTO: 7.1 10X3/UL (ref 4.8–10.8)

## 2019-02-02 PROCEDURE — B2141ZZ FLUOROSCOPY OF RIGHT HEART USING LOW OSMOLAR CONTRAST: ICD-10-PCS | Performed by: SURGERY

## 2019-02-02 PROCEDURE — 02H633Z INSERTION OF INFUSION DEVICE INTO RIGHT ATRIUM, PERCUTANEOUS APPROACH: ICD-10-PCS | Performed by: SURGERY

## 2019-02-02 PROCEDURE — 0JH63XZ INSERTION OF TUNNELED VASCULAR ACCESS DEVICE INTO CHEST SUBCUTANEOUS TISSUE AND FASCIA, PERCUTANEOUS APPROACH: ICD-10-PCS | Performed by: SURGERY

## 2019-02-02 NOTE — NUR
RETURNED TO ROOM VIA BED FROM SURGERY.  A/A/OX4.  STATES A LITTLE BIT OF
STINGING PAIN AROUND INSERTION SITE, BUT HEAVIEST PAIN HAS BEEN ALLEVIATED.
V/S STABLE.  ICE BAG PLACE TO OPERATIVE SITE.

## 2019-02-02 NOTE — NUR
RECEIVED A/A/OX4.  DENIES ANY PAIN AT THIS TIME AND NO REQUESTS VOICED.
ASSESSMENT COMPLETED.  DRESSING TO [facility] DUE TO HERNIA REPAIR DONE ON 1/29/19.
LEFT AVF SITE BRUISED.  BRUIT AND THRILL +.  MARSH PATENT AND DRAINING LIGHT
YELLOW COLORED URINE.  WILL CPOC.  REMAINS NPO FOR SURGERY TODAY.

## 2019-02-02 NOTE — NUR
RECIEVED UP AMBULATING AROUND HALLWAY WITH MOTHER. ALERT AND ORIENTED X4. LEFT
ARM RESERVED PALPABLE WITH GOOD BRUIT AND TRILL. IV TO RIGHT HAND AND
HEMOSPLIT TO RIGHT CHEST. NO REDNESS OR SWELLING TO SITES AND DRESSINGS
INTACT. DSG X2 TO LOWER ABD CDI. BRUISING TO LEFT UUPER AND LOWER ARM AND TO
LOWER ABD. JACQUES DRAIN TO LL ABD WITH SMALL AMT OF SEROSANGEOUS DRAINAGE. REQUEST
A SANDWICH TRAY STATING HE'S HUNGRY. TRAY GIVEN AND DENIES ANY OTHER NEEDS.

## 2019-02-02 NOTE — MORECARE
CASE MANAGEMENT DISCHARGE SUMMARY
 
 
PATIENT: MARU ASHBY              UNIT: [id number]
ACCOUNT#: [id number]                       ADM DATE: 19
AGE: 28     : [birth year]  SEX: M            ROOM/BED: D.2131    
AUTHOR: MAYELIN CRUZ                             PHYSICIAN:                               
 
REFERRING PHYSICIAN: VINICIUS BETHEA MD                  
DATE OF SERVICE: 19
Discharge Plan
 
 
Patient Name: MARU ASHBY
Facility: [facility]:[place]
Encounter #: [id number]
Medical Record #: [id number]
: 1990
Planned Disposition: Home with Home Health
Anticipated Discharge Date: 19
 
Discharge Date: 
Expected LOS: 4
Initial Reviewer: [id number]
Initial Review Date: 2019
Generated: 19   6:08 pm 
Comments
 
DCP- Discharge Planning
 
Updated by [id number]: Chelsie Keating on 19   4:00 pm CT
ORDER RECEIVED: to make arrangements for OP HD in [place]. 
 
CM spoke to Shonda ZULUAGA who stated the patient is going to be new to 
hemodialysis and his first dialysis will be today post hemosplit placement.  
She stated that the patient will need to go to [place] as that is the 
only facility close to where he lives.  Cm asked that she order the hepatitis 
panel and HIV.  She stated they no longer require HIV testing.  Hepatitis 
panel ordered.  Cm faxed Admission intake form, facesheet, H&P, CXR, 
Progress 
notes, all hospital labs, with pending hepatitis panel, medication list with 
allergies listed at the top to [facility].  Cm left Socorro Llamas a message but 
no return call as it is Saturday and she only works [employer].  No operative report 
to fax at this time in the chart. CM will continue to follow and will assist 
as needed with dc plans/needs.  
Chelsie Keating Rn, [facility]
DCP- Discharge Planning
 
Updated by [id number]: Carl Bojorquez on 19   4:22 pm CT
Patient Name: MARU ASHBY                                     
 
Admission Status: Elective   
Accout number: [id number]                              
Admission Date: 2019   
: 1990                                                        
Admission Diagnosis:   
Attending: VINICIUS BETHEA                                                
Current LOS:  3   
  
Anticipated DC Date: 2019   
Planned Disposition: Home with Home Health   
Primary Insurance: [employer] [place] PRIVATE OPTIONS CODI   
PLANNED EXTERNAL PROVIDER:  [facility]  
  
Discharge Planning Comments:   
CM RECEIVED HOME HEALTH ORDER, MET WITH PT IN ROOM TO DISCUSS DISCHARGE 
PLANNING AND NEEDS.  PT REPORTS LIVING AT HOME INDEPENDENTLY WITH PARENTS AND 
ADULT SISTER.   PT HAS BLOOD PRESSURE MONITOR AND NO MEDICAL EQUIPMENT  
PROVIDER PREFERENCE.  PT HAS NO OUTSIDE SERVICES ASSISTING IN THE HOME. PT IS 
NOT YET DOING OUTPATIENT DIALYSIS, BUT HAD A FISTULA  CREATED FOR WHEN HE MAY 
NEED IT.  CM DISCUSSED AVAILABILITY OF HOME HEALTH, REHAB SERVICES AND 
MEDICAL EQUIPMENT. PT WILL ACCEPT HOME HEALTH, REPORTS H IS SISTER TO BE A 
TEACHABLE CAREGIVER IN HOME.  PT REPORTS HIS PARENTS WILL PICK HIM UP FOR 
DISCHARGE HOME TOMORROW. PT HAS NO PREFERENCE ON HOME HEALTH PROVIDER, CHOICE 
LETTER SIGNED.  PT DENIES FURTHER DISCHARGE NEEDS.  
  
CM SPOKE TO OCTAVIANO OF [facility] , DISCUSSED REFERRAL.  OCTAVIANO THINKS PT MAY BE WITHIN 
THE SERVICE AREA FOR [facility].  CM CALLED [facility], [phone], SPOKE TO CHELSIE 
AND PROVIDED REFERRAL INFORMATION.  CM FAXED REFERRAL FOR HOME HEALTH TO [facility] [phone].  
  
CM WAITING ACCEPTANCE DETERMINATION FROM [facility].  
  
: Carl Bojorquez  
  
Appended by Carl Bojorquez on 2019 17:22 CST:  
CM RECEIVED CALL FROM CHELSIE OF [facility], THEY WILL ACCEPT PT AND 
PLAN FOR HOME HEALTH ADMISSION ON SATURDAY, 19.  
  
FOR DISCHARGE, NOTIFY [facility] [phone].  FAX DISCHARGE 
INFORMATION TO [facility] [phone].   
  
CARL BOJORQUEZ, CASE MANAGEMENT
 DCPIA - Discharge Planning Initial Assessment
 
Updated by [id number]: Carl Bojorquez on 19   1:37 pm
*  Is the patient Alert and Oriented?
Yes
*  How many steps to enter\exit or inside your home? NONE *  PCP VINICIUS BETHEA *  Pharmacy
[facility]
*  Preadmission Environment
Home with Family
*  ADLs
 
Independent
*  Equipment
Other
*  Other Equipment
BLOOD PRESSURE CUFF  NO MEDICAL EQUIPMENT PROVIDER PREFERENCE
 
*  List name and contact numbers for known caregivers / representatives who 
currently or will assist patient after discharge:
BAR ASHBY, MOTHER, [phone]
*  Verbal permission to speak to the caregivers and representatives has been 
obtained from the patient.
Yes
*  Community resources currently utilized
None
*  Please name any agencies selected above.
NONE
*  Additional services required to return to the preadmission environment?
Yes
*  Can the patient safely return to the preadmission environment?
Yes
*  Has this patient been hospitalized within the prior 30 days at any 
hospital?
No
 
 
 
 
 
Coverage Notice
 
Reviewer: [email or web address] Rhett Bojorquez
 
Notice Issued Date-Time: 2019  11:20
Notice Type: Patient Choice Letter
 
Notice Delivered To: Patient
Relationship to Patient: 
Representative Name: 
 
Delivery Method: HAND - Hand Delivered
Minnie Days:
Prior Verbal Notification: 
 
Recipient Understood Notice: Yes
Recipient Signature: Yes
Med Rec Note Co-signed by Attending:
 
Coverage Notice Comment:  NO HOME HEALTH PREFERENCE
 
Last DP export: 19   4:01 pm
Patient Name: MARU ASHBY
 
Encounter #: [id number]
Page [id number]
 
 
 
 
 
Electronically Signed by MAYELIN CRUZ on 19 at 1708
 
 
 
 
 
 
**All edits/amendments must be made on the electronic document**
 
DICTATION DATE: 19     : JUWAN  19     
RPT#: [id number]                                DC DATE:        
                                               STATUS: ADM IN  
[facility]
[address] [place] [address]
***END OF REPORT***

## 2019-02-02 NOTE — MORECARE
CASE MANAGEMENT DISCHARGE SUMMARY
 
 
PATIENT: MARU ASHBY              UNIT: [id number]
ACCOUNT#: [id number]                       ADM DATE: 19
AGE: 28     : [birth year]  SEX: M            ROOM/BED: D.2131    
AUTHOR: MAYELIN CRUZ                             PHYSICIAN:                               
 
REFERRING PHYSICIAN: VINICIUS BETHEA MD                  
DATE OF SERVICE: 19
Discharge Plan
 
 
Patient Name: MARU ASHBY
Facility: [facility]
Encounter #: [id number]
Medical Record #: [id number]
: 1990
Planned Disposition: Home with Home Health
Anticipated Discharge Date: 19
 
Discharge Date: 
Expected LOS: 4
Initial Reviewer: [id number]
Initial Review Date: 2019
Generated: 19   6:01 pm 
Comments
 
DCP- Discharge Planning
 
Updated by [id number]: Chelsie Keating on 19   4:00 pm CT
ORDER RECEIVED: to make arrangements for OP HD in [place]. 
 
CM spoke to Shonda ZULUAGA who stated the patient is going to be new to 
hemodialysis and his first dialysis will be today post hemosplit placement.  
She stated that the patient will need to go to [place] as that is the 
only facility close to where he lives.  Cm asked that she order the hepatitis 
panel and HIV.  She stated they no longer require HIV testing.  Hepatitis 
panel ordered.  Cm faxed Admission intake form, facesheet, H&P, CXR, 
Progress 
notes, all hospital labs, with pending hepatitis panel, medication list with 
allergies listed at the top to [facility].  Cm left Socorro Llamas a message but 
no return call as it is Saturday and she only works [employer].  No operative report 
to fax at this time in the chart. CM will continue to follow and will assist 
as needed with dc plans/needs.  
Chelsie Keating Rn, [facility]
DCP- Discharge Planning
 
Updated by [id number]: Carl Bojorquez on 19   4:22 pm CT
Patient Name: MARU ASHBY                                     
 
Admission Status: Elective   
Accout number: [id number]                              
Admission Date: 2019   
: 1990                                                        
Admission Diagnosis:   
Attending: VINICIUS BETHEA                                                
Current LOS:  3   
  
Anticipated DC Date: 2019   
Planned Disposition: Home with Home Health   
Primary Insurance: [employer] [place] PRIVATE OPTIONS CODI   
PLANNED EXTERNAL PROVIDER:  [facility]  
  
Discharge Planning Comments:   
CM RECEIVED HOME HEALTH ORDER, MET WITH PT IN ROOM TO DISCUSS DISCHARGE 
PLANNING AND NEEDS.  PT REPORTS LIVING AT HOME INDEPENDENTLY WITH PARENTS AND 
ADULT SISTER.   PT HAS BLOOD PRESSURE MONITOR AND NO MEDICAL EQUIPMENT  
PROVIDER PREFERENCE.  PT HAS NO OUTSIDE SERVICES ASSISTING IN THE HOME. PT IS 
NOT YET DOING OUTPATIENT DIALYSIS, BUT HAD A FISTULA  CREATED FOR WHEN HE MAY 
NEED IT.  CM DISCUSSED AVAILABILITY OF HOME HEALTH, REHAB SERVICES AND 
MEDICAL EQUIPMENT. PT WILL ACCEPT HOME HEALTH, REPORTS H IS SISTER TO BE A 
TEACHABLE CAREGIVER IN HOME.  PT REPORTS HIS PARENTS WILL PICK HIM UP FOR 
DISCHARGE HOME TOMORROW. PT HAS NO PREFERENCE ON HOME HEALTH PROVIDER, CHOICE 
LETTER SIGNED.  PT DENIES FURTHER DISCHARGE NEEDS.  
  
CM SPOKE TO OCTAVIANO OF CARE , DISCUSSED REFERRAL.  OCTAVIANO THINKS PT MAY BE WITHIN 
THE SERVICE AREA FOR [facility].  CM CALLED [facility], [phone], SPOKE TO CHELSIE 
AND PROVIDED REFERRAL INFORMATION.  CM FAXED REFERRAL FOR HOME HEALTH TO [facility] [phone].  
  
CM WAITING ACCEPTANCE DETERMINATION FROM [facility].  
  
: Carl Bojorquez  
  
Appended by Carl Bojorquez on 2019 17:22 CST:  
CM RECEIVED CALL FROM CHELSIE OF [facility], THEY WILL ACCEPT PT AND 
PLAN FOR HOME HEALTH ADMISSION ON SATURDAY, 19.  
  
FOR DISCHARGE, NOTIFY [facility] -[phone].  FAX DISCHARGE 
INFORMATION TO [facility] -[phone].   
  
CARL BOJORQUEZ, CASE MANAGEMENT
 DCPIA - Discharge Planning Initial Assessment
 
Updated by [id number]: Carl Bojorquez on 19   1:37 pm
*  Is the patient Alert and Oriented?
Yes
*  How many steps to enter\exit or inside your home? NONE *  PCP VINICIUS BETHEA *  Pharmacy
[facility]
*  Preadmission Environment
Home with Family
*  ADLs
 
Independent
*  Equipment
Other
*  Other Equipment
BLOOD PRESSURE CUFF  NO MEDICAL EQUIPMENT PROVIDER PREFERENCE
 
*  List name and contact numbers for known caregivers / representatives who 
currently or will assist patient after discharge:
BAR ASHBY, MOTHER, [phone]
*  Verbal permission to speak to the caregivers and representatives has been 
obtained from the patient.
Yes
*  Community resources currently utilized
None
*  Please name any agencies selected above.
NONE
*  Additional services required to return to the preadmission environment?
Yes
*  Can the patient safely return to the preadmission environment?
Yes
*  Has this patient been hospitalized within the prior 30 days at any 
hospital?
No
 
 
 
 
 
Coverage Notice
 
Reviewer: [email or web address] Rhett Bojorquez
 
Notice Issued Date-Time: 2019  11:20
Notice Type: Patient Choice Letter
 
Notice Delivered To: Patient
Relationship to Patient: 
Representative Name: 
 
Delivery Method: HAND - Hand Delivered
Minnie Days:
Prior Verbal Notification: 
 
Recipient Understood Notice: Yes
Recipient Signature: Yes
Med Rec Note Co-signed by Attending:
 
Coverage Notice Comment:  NO HOME HEALTH PREFERENCE
 
Last DP export: 19   4:30 p
Patient Name: MARU ASHBY
 
Encounter #: [id number]
Page [id number]
 
 
 
 
 
Electronically Signed by MAYELIN CRUZ on 19 at 1701
 
 
 
 
 
 
**All edits/amendments must be made on the electronic document**
 
DICTATION DATE: 19     : JUWAN  19     
RPT#: [id number]                                DC DATE:        
                                               STATUS: ADM IN  
[facility]
[address] [place] [address]
***END OF REPORT***

## 2019-02-03 VITALS — DIASTOLIC BLOOD PRESSURE: 73 MMHG | SYSTOLIC BLOOD PRESSURE: 117 MMHG

## 2019-02-03 VITALS — DIASTOLIC BLOOD PRESSURE: 79 MMHG | SYSTOLIC BLOOD PRESSURE: 132 MMHG

## 2019-02-03 VITALS — SYSTOLIC BLOOD PRESSURE: 131 MMHG | DIASTOLIC BLOOD PRESSURE: 80 MMHG

## 2019-02-03 VITALS — DIASTOLIC BLOOD PRESSURE: 74 MMHG | SYSTOLIC BLOOD PRESSURE: 149 MMHG

## 2019-02-03 VITALS — DIASTOLIC BLOOD PRESSURE: 80 MMHG | SYSTOLIC BLOOD PRESSURE: 131 MMHG

## 2019-02-03 VITALS — SYSTOLIC BLOOD PRESSURE: 135 MMHG | DIASTOLIC BLOOD PRESSURE: 90 MMHG

## 2019-02-03 LAB
ALBUMIN SERPL-MCNC: 0.7 G/DL (ref 3.4–5)
ALP SERPL-CCNC: 42 U/L (ref 46–116)
ALT SERPL-CCNC: 6 U/L (ref 10–68)
ANION GAP SERPL CALC-SCNC: 14.4 MMOL/L (ref 8–16)
BASOPHILS NFR BLD AUTO: 0.5 % (ref 0–2)
BILIRUB SERPL-MCNC: 0.18 MG/DL (ref 0.2–1.3)
BUN SERPL-MCNC: 56 MG/DL (ref 7–18)
CALCIUM SERPL-MCNC: 6.7 MG/DL (ref 8.5–10.1)
CHLORIDE SERPL-SCNC: 106 MMOL/L (ref 98–107)
CO2 SERPL-SCNC: 22.8 MMOL/L (ref 21–32)
CREAT SERPL-MCNC: 6.2 MG/DL (ref 0.6–1.3)
EOSINOPHIL NFR BLD: 1.8 % (ref 0–7)
ERYTHROCYTE [DISTWIDTH] IN BLOOD BY AUTOMATED COUNT: 13.7 % (ref 11.5–14.5)
GLOBULIN SER-MCNC: 3.1 G/L
GLUCOSE SERPL-MCNC: 85 MG/DL (ref 74–106)
HCT VFR BLD CALC: 25.8 % (ref 42–54)
HGB BLD-MCNC: 8.5 G/DL (ref 13.5–17.5)
IMM GRANULOCYTES NFR BLD: 0.9 % (ref 0–5)
LYMPHOCYTES NFR BLD AUTO: 10.3 % (ref 15–50)
MCH RBC QN AUTO: 29.6 PG (ref 26–34)
MCHC RBC AUTO-ENTMCNC: 32.9 G/DL (ref 31–37)
MCV RBC: 89.9 FL (ref 80–100)
MONOCYTES NFR BLD: 8.7 % (ref 2–11)
NEUTROPHILS NFR BLD AUTO: 77.8 % (ref 40–80)
OSMOLALITY SERPL CALC.SUM OF ELEC: 293 MOSM/KG (ref 275–300)
PHOSPHATE SERPL-MCNC: 8 MG/DL (ref 2.5–4.9)
PLATELET # BLD: 165 10X3/UL (ref 130–400)
PMV BLD AUTO: 10.2 FL (ref 7.4–10.4)
POTASSIUM SERPL-SCNC: 3.2 MMOL/L (ref 3.5–5.1)
PROT SERPL-MCNC: 3.8 G/DL (ref 6.4–8.2)
RBC # BLD AUTO: 2.87 10X6/UL (ref 4.2–6.1)
SODIUM SERPL-SCNC: 140 MMOL/L (ref 136–145)
WBC # BLD AUTO: 5.6 10X3/UL (ref 4.8–10.8)

## 2019-02-03 NOTE — NUR
RECIEVED UP IN BED WITH EYES OPEN AND TV ON. ALERT AND ORIENTED X 4. CONT. TO
DO BLADDER TRAINING. JACQUES DRAIN IN PLACE WITH NO DRAINAGE AT THIS TIME F/C
INTACT WITH SOME CLEAR YELLOW URINE IN DRAINAGE BAG. IV TO RIGHT HAND SL WITH
DSG INTACT. HEMOSPLIT TO RIGHT CHEST WITH DSG CDI. LEFT ARM RESERVED D/T AVF.

## 2019-02-03 NOTE — NUR
AM MEDS GIVEN. PATIENT IS INSTRCUTED IN BLADDER TRAINING AND TO CALL ME IF HE
BECOMES TO UNCOMFORTABLE. MARSH CATH IS CLAMPED AT THIS TIME.

## 2019-02-03 NOTE — NUR
ROUNDING DONE WITH PATIENT RESTING WITH HOB UP AT 30 DEGREES. RESP ARE EVEN.
MOTHER IS ASLEEP IN CHAIR. RIGHT HAND PIV SEEN WITH SALINE LOCK. RIGHT CHEST
HEMISPLIT SEEN WITH C/D/I DRESSING. RESEV. LEFT ARM WITH AVF. WILL CHECK FOR
BRUIT AND THRILL WHEN AWAKE. MARSH CATH SEEN PATENT WITH CLEAR YELLOW URINE.

## 2019-02-04 VITALS — SYSTOLIC BLOOD PRESSURE: 131 MMHG | DIASTOLIC BLOOD PRESSURE: 80 MMHG

## 2019-02-04 VITALS — SYSTOLIC BLOOD PRESSURE: 132 MMHG | DIASTOLIC BLOOD PRESSURE: 74 MMHG

## 2019-02-04 VITALS — DIASTOLIC BLOOD PRESSURE: 67 MMHG | SYSTOLIC BLOOD PRESSURE: 128 MMHG

## 2019-02-04 VITALS — DIASTOLIC BLOOD PRESSURE: 863 MMHG | SYSTOLIC BLOOD PRESSURE: 134 MMHG

## 2019-02-04 VITALS — DIASTOLIC BLOOD PRESSURE: 74 MMHG | SYSTOLIC BLOOD PRESSURE: 125 MMHG

## 2019-02-04 VITALS — DIASTOLIC BLOOD PRESSURE: 71 MMHG | SYSTOLIC BLOOD PRESSURE: 135 MMHG

## 2019-02-04 LAB
ALBUMIN SERPL-MCNC: 0.7 G/DL (ref 3.4–5)
ALP SERPL-CCNC: 38 U/L (ref 46–116)
ALT SERPL-CCNC: 7 U/L (ref 10–68)
ANION GAP SERPL CALC-SCNC: 16 MMOL/L (ref 8–16)
BASOPHILS NFR BLD AUTO: 0.5 % (ref 0–2)
BILIRUB SERPL-MCNC: 0.18 MG/DL (ref 0.2–1.3)
BUN SERPL-MCNC: 59 MG/DL (ref 7–18)
CALCIUM SERPL-MCNC: 7 MG/DL (ref 8.5–10.1)
CHLORIDE SERPL-SCNC: 108 MMOL/L (ref 98–107)
CO2 SERPL-SCNC: 22.1 MMOL/L (ref 21–32)
CREAT SERPL-MCNC: 6.7 MG/DL (ref 0.6–1.3)
EOSINOPHIL NFR BLD: 1.8 % (ref 0–7)
ERYTHROCYTE [DISTWIDTH] IN BLOOD BY AUTOMATED COUNT: 14 % (ref 11.5–14.5)
GLOBULIN SER-MCNC: 3.1 G/L
GLUCOSE SERPL-MCNC: 83 MG/DL (ref 74–106)
HCT VFR BLD CALC: 26.8 % (ref 42–54)
HGB BLD-MCNC: 8.7 G/DL (ref 13.5–17.5)
IMM GRANULOCYTES NFR BLD: 0.9 % (ref 0–5)
LYMPHOCYTES NFR BLD AUTO: 13.1 % (ref 15–50)
MCH RBC QN AUTO: 29.4 PG (ref 26–34)
MCHC RBC AUTO-ENTMCNC: 32.5 G/DL (ref 31–37)
MCV RBC: 90.5 FL (ref 80–100)
MONOCYTES NFR BLD: 11 % (ref 2–11)
NEUTROPHILS NFR BLD AUTO: 72.7 % (ref 40–80)
OSMOLALITY SERPL CALC.SUM OF ELEC: 300 MOSM/KG (ref 275–300)
PLATELET # BLD: 156 10X3/UL (ref 130–400)
PMV BLD AUTO: 10.2 FL (ref 7.4–10.4)
POTASSIUM SERPL-SCNC: 3.1 MMOL/L (ref 3.5–5.1)
PROT SERPL-MCNC: 3.8 G/DL (ref 6.4–8.2)
RBC # BLD AUTO: 2.96 10X6/UL (ref 4.2–6.1)
SODIUM SERPL-SCNC: 143 MMOL/L (ref 136–145)
WBC # BLD AUTO: 6.6 10X3/UL (ref 4.8–10.8)

## 2019-02-04 NOTE — NUR
ASSESSMENT COMPLETE, PT A&O.  RESPERATIONS EVEN ON RA.  RIGHT CHEST HEMOSPLIT
WITH DRSG INTACT. AV FISTULA NOTED TO LEFT ARM.  2 DRSGS NOTED TO LOWER ABD
WITH JACQUES DRAIN TO LEFT DRSG WITH SMALL AMOUNT OF SEROUS SANGUINEOUS FLUID NOTED
IN BULB.  MARSH DRAINING TO GRAVITY.  PT DENIES PAIN OR NEEDS, BED LOW, CL IN
REACH.

## 2019-02-04 NOTE — NUR
Nutrition follow-up/consult:
Diet: renal
PO Intake ~75% average of meals; pt has been npo x several days due to surgery
Pt is new to dialysis
Albumin very low; may increase as fluid removed.
Wt: 184#
PO intake is good at this time
RDN will order Nepro with meals to increase kcal, protein intake.
Following.

## 2019-02-04 NOTE — NUR
ASSESSMENT COMPLETED. AWAKE AND ALERT. HE HAS A HEMOSPLIT IN THE RIGHT CHEST.
A LVF IN THE LEFT ARM. DRSG TO ABD WITH A JACQUES DRAIN. MARSH CATH PATENT. FOR
DIALYSIS TODAY

## 2019-02-04 NOTE — NUR
LOWER ABD INCISION IS HEALING WELL. NO REDNESS, EDEMA OR ODOR. SCANT BLOODY
DRAINAGE NOTED AFTER REMOVING DRESSING.  JACQUES DRAIN INTACT WITH BULB COMPRESSED.
APPROX 5ML OF SANGUINOUS DRAINAGE NOTED IN BULB.
INCISION TO LEFT ANTECUBITAL IS INTACT WITH NO SIGNS OF INFECTION.
CLEANSED ALL WOUNDS AND PATTED DRY. COVERED JACQUES SITE WITH WHITE BORDERED GAUZE
AND ABDOMEN AND LEFT ARM WITH MEPILEX BORDER.
PT TOLERATED WELL.

## 2019-02-05 VITALS — DIASTOLIC BLOOD PRESSURE: 68 MMHG | SYSTOLIC BLOOD PRESSURE: 128 MMHG

## 2019-02-05 VITALS — DIASTOLIC BLOOD PRESSURE: 84 MMHG | SYSTOLIC BLOOD PRESSURE: 139 MMHG

## 2019-02-05 VITALS — DIASTOLIC BLOOD PRESSURE: 59 MMHG | SYSTOLIC BLOOD PRESSURE: 127 MMHG

## 2019-02-05 VITALS — DIASTOLIC BLOOD PRESSURE: 81 MMHG | SYSTOLIC BLOOD PRESSURE: 128 MMHG

## 2019-02-05 VITALS — SYSTOLIC BLOOD PRESSURE: 113 MMHG | DIASTOLIC BLOOD PRESSURE: 73 MMHG

## 2019-02-05 VITALS — SYSTOLIC BLOOD PRESSURE: 130 MMHG | DIASTOLIC BLOOD PRESSURE: 76 MMHG

## 2019-02-05 LAB
ALBUMIN SERPL-MCNC: 0.7 G/DL (ref 3.4–5)
ALP SERPL-CCNC: 37 U/L (ref 46–116)
ALT SERPL-CCNC: 7 U/L (ref 10–68)
ANION GAP SERPL CALC-SCNC: 10.8 MMOL/L (ref 8–16)
BASOPHILS NFR BLD AUTO: 0.3 % (ref 0–2)
BILIRUB SERPL-MCNC: 0.14 MG/DL (ref 0.2–1.3)
BUN SERPL-MCNC: 40 MG/DL (ref 7–18)
CALCIUM SERPL-MCNC: 7.2 MG/DL (ref 8.5–10.1)
CHLORIDE SERPL-SCNC: 107 MMOL/L (ref 98–107)
CO2 SERPL-SCNC: 27.6 MMOL/L (ref 21–32)
CREAT SERPL-MCNC: 5.2 MG/DL (ref 0.6–1.3)
EOSINOPHIL NFR BLD: 1.2 % (ref 0–7)
ERYTHROCYTE [DISTWIDTH] IN BLOOD BY AUTOMATED COUNT: 13.8 % (ref 11.5–14.5)
GLOBULIN SER-MCNC: 3.2 G/L
GLUCOSE SERPL-MCNC: 83 MG/DL (ref 74–106)
HCT VFR BLD CALC: 28.3 % (ref 42–54)
HGB BLD-MCNC: 8.9 G/DL (ref 13.5–17.5)
IMM GRANULOCYTES NFR BLD: 0.8 % (ref 0–5)
LYMPHOCYTES NFR BLD AUTO: 13.3 % (ref 15–50)
MCH RBC QN AUTO: 28.8 PG (ref 26–34)
MCHC RBC AUTO-ENTMCNC: 31.4 G/DL (ref 31–37)
MCV RBC: 91.6 FL (ref 80–100)
MONOCYTES NFR BLD: 12.3 % (ref 2–11)
NEUTROPHILS NFR BLD AUTO: 72.1 % (ref 40–80)
OSMOLALITY SERPL CALC.SUM OF ELEC: 291 MOSM/KG (ref 275–300)
PHOSPHATE SERPL-MCNC: 6.1 MG/DL (ref 2.5–4.9)
PLATELET # BLD: 164 10X3/UL (ref 130–400)
PMV BLD AUTO: 10.1 FL (ref 7.4–10.4)
POTASSIUM SERPL-SCNC: 3.4 MMOL/L (ref 3.5–5.1)
PROT SERPL-MCNC: 3.9 G/DL (ref 6.4–8.2)
RBC # BLD AUTO: 3.09 10X6/UL (ref 4.2–6.1)
SODIUM SERPL-SCNC: 142 MMOL/L (ref 136–145)
WBC # BLD AUTO: 7.4 10X3/UL (ref 4.8–10.8)

## 2019-02-06 VITALS — SYSTOLIC BLOOD PRESSURE: 121 MMHG | DIASTOLIC BLOOD PRESSURE: 76 MMHG

## 2019-02-06 VITALS — SYSTOLIC BLOOD PRESSURE: 144 MMHG | DIASTOLIC BLOOD PRESSURE: 82 MMHG

## 2019-02-06 VITALS — DIASTOLIC BLOOD PRESSURE: 72 MMHG | SYSTOLIC BLOOD PRESSURE: 124 MMHG

## 2019-02-06 VITALS — DIASTOLIC BLOOD PRESSURE: 82 MMHG | SYSTOLIC BLOOD PRESSURE: 118 MMHG

## 2019-02-06 LAB
ALBUMIN SERPL-MCNC: 0.7 G/DL (ref 3.4–5)
ALP SERPL-CCNC: 40 U/L (ref 46–116)
ALT SERPL-CCNC: < 6 U/L (ref 10–68)
ANION GAP SERPL CALC-SCNC: 12 MMOL/L (ref 8–16)
BASOPHILS NFR BLD AUTO: 0.6 % (ref 0–2)
BILIRUB SERPL-MCNC: 0.23 MG/DL (ref 0.2–1.3)
BUN SERPL-MCNC: 42 MG/DL (ref 7–18)
CALCIUM SERPL-MCNC: 7.2 MG/DL (ref 8.5–10.1)
CHLORIDE SERPL-SCNC: 108 MMOL/L (ref 98–107)
CO2 SERPL-SCNC: 25.3 MMOL/L (ref 21–32)
CREAT SERPL-MCNC: 5.4 MG/DL (ref 0.6–1.3)
EOSINOPHIL NFR BLD: 2.3 % (ref 0–7)
ERYTHROCYTE [DISTWIDTH] IN BLOOD BY AUTOMATED COUNT: 13.7 % (ref 11.5–14.5)
GLOBULIN SER-MCNC: 3 G/L
GLUCOSE SERPL-MCNC: 84 MG/DL (ref 74–106)
HCT VFR BLD CALC: 28.2 % (ref 42–54)
HCV AB S/CO SERPL IA: <0.1 S/CO RAT (ref 0–0.9)
HGB BLD-MCNC: 8.9 G/DL (ref 13.5–17.5)
IMM GRANULOCYTES NFR BLD: 1.1 % (ref 0–5)
LYMPHOCYTES NFR BLD AUTO: 17 % (ref 15–50)
MCH RBC QN AUTO: 29.1 PG (ref 26–34)
MCHC RBC AUTO-ENTMCNC: 31.6 G/DL (ref 31–37)
MCV RBC: 92.2 FL (ref 80–100)
MONOCYTES NFR BLD: 12 % (ref 2–11)
NEUTROPHILS NFR BLD AUTO: 67 % (ref 40–80)
OSMOLALITY SERPL CALC.SUM OF ELEC: 292 MOSM/KG (ref 275–300)
PLATELET # BLD: 166 10X3/UL (ref 130–400)
PMV BLD AUTO: 10.6 FL (ref 7.4–10.4)
POTASSIUM SERPL-SCNC: 3.3 MMOL/L (ref 3.5–5.1)
PROT SERPL-MCNC: 3.7 G/DL (ref 6.4–8.2)
RBC # BLD AUTO: 3.06 10X6/UL (ref 4.2–6.1)
SODIUM SERPL-SCNC: 142 MMOL/L (ref 136–145)
WBC # BLD AUTO: 7.2 10X3/UL (ref 4.8–10.8)

## 2019-02-06 NOTE — NUR
PATIENT RETURNED FROM DIALYSIS IN BED VIA HOSPITAL PERSONNEL. PATIENT IS
STABLE AND VS ARE GOOD. PATIENT REQUEST MARSH BE REMOVED. SPOKE WITH LAZARUS FLANAGAN.INSTRUCTED TO CLAMP MARSH FOR BLADDER TRAINING. MARSH CLAMPED. WILL
CONTINUE TO MONITOR VERY CLOSELY.

## 2019-02-06 NOTE — NUR
REPORT RECIEVED FROM NIGHT SHIFT. PATIENT AMBULTING IN HALLWAY. MOTHER AT
SIDE. PATIENT DENIES ANY NEEDS OR PAIN. WILL CONTINUE WITH PLAN OF CARE.

## 2019-02-06 NOTE — NUR
PT AROUSES TO NURSE ENTERING ROOM. PT ALERT AND ORIENTED. DENIES ANY NEEDS. NO
S/S OF DISTRESS. RESP EVEN AND UNLABORED. FAMILY AT BEDSIDE. PT WILL CALL FOR
ASSIST WHEN NEEDED. WILL CPOC

## 2019-02-06 NOTE — NUR
Dialysis Coordinator: Patient accepted at [facility] on MWF.
Arrive @ 3:50pm for 4:00pm on-time. Patient can start in-center on Friday,
2/8/19 @ 3:30pm for paperwork. Patient needs to bring 2 forms of ID and
insurance cards to first appointment. Welcome Letter faxed to CM for patient.
MICKY GEE.

## 2019-02-06 NOTE — NUR
PER DR JOSEPH, PUT IN ORDER TO D/C PT TO HOME. PT TO F/U WITH [facility] ON FRIDAY 02/08/19, ORDER NOTED.

## 2019-02-06 NOTE — MORECARE
CASE MANAGEMENT DISCHARGE SUMMARY
 
 
PATIENT: MARU ASHBY              UNIT: [id number]
ACCOUNT#: [id number]                       ADM DATE: 19
AGE: 28     : [birth year]  SEX: M            ROOM/BED: D.2131    
AUTHOR: MAYELIN CRUZ                             PHYSICIAN:                               
 
REFERRING PHYSICIAN: VINICIUS BETHEA MD                  
DATE OF SERVICE: 19
Discharge Plan
 
 
Patient Name: MARU ASHBY
Facility: [facility]:[place]
Encounter #: [id number]
Medical Record #: [id number]
: 1990
Planned Disposition: Home with Home Health
Anticipated Discharge Date: 19
 
Discharge Date: 
Expected LOS: 9
Initial Reviewer: [id number]
Initial Review Date: 2019
Generated: 19   6:44 pm 
Comments
 
DCP- Discharge Planning
 
Updated by [id number]: Carl Bojorquez on 19   4:43 pm CT
ORDER RECEIVED: to make arrangements for OP HD in [place].  
  
CM spoke to Shonda ZULUAGA who stated the patient is going to be new to 
hemodialysis and his first dialysis will be today post hemosplit placement.  
She stated that the patient will need to go to [place] as that is the 
only facility close to where he lives.  Cm asked that she order the hepatitis 
panel and HIV.  She stated they no longer require HIV testing.  Hepatitis 
panel ordered.  Cm faxed Admission intake form, facesheet, H&P, CXR, 
Progress 
notes, all hospital labs, with pending hepatitis panel, medication list with 
allergies listed at the top to [facility].  Cm left Socorro Llamas a message but 
no return call as it is Saturday and she only works [employer].  No operative report 
to fax at this time in the chart. CM will continue to follow and will assist 
as needed with dc plans/needs.   
Chelsie Keating Rn, [facility]
DCP- Discharge Planning
 
Updated by [id number]: Carl Bojorquez on 19   4:22 pm CT
Patient Name: MARU ASHBY                                     
 
Admission Status: Elective   
Accout number: [id number]                              
Admission Date: 2019   
: 1990                                                        
Admission Diagnosis:   
Attending: VINICIUS BETHEA                                                
Current LOS:  3   
  
Anticipated DC Date: 2019   
Planned Disposition: Home with Home Health   
Primary Insurance: [employer] [place] PRIVATE OPTIONS OCDI   
PLANNED EXTERNAL PROVIDER:  [facility]  
  
Discharge Planning Comments:   
CM RECEIVED HOME HEALTH ORDER, MET WITH PT IN ROOM TO DISCUSS DISCHARGE 
PLANNING AND NEEDS.  PT REPORTS LIVING AT HOME INDEPENDENTLY WITH PARENTS AND 
ADULT SISTER.   PT HAS BLOOD PRESSURE MONITOR AND NO MEDICAL EQUIPMENT  
PROVIDER PREFERENCE.  PT HAS NO OUTSIDE SERVICES ASSISTING IN THE HOME. PT IS 
NOT YET DOING OUTPATIENT DIALYSIS, BUT HAD A FISTULA  CREATED FOR WHEN HE MAY 
NEED IT.  CM DISCUSSED AVAILABILITY OF HOME HEALTH, REHAB SERVICES AND 
MEDICAL EQUIPMENT. PT WILL ACCEPT HOME HEALTH, REPORTS H IS SISTER TO BE A 
TEACHABLE CAREGIVER IN HOME.  PT REPORTS HIS PARENTS WILL PICK HIM UP FOR 
DISCHARGE HOME TOMORROW. PT HAS NO PREFERENCE ON HOME HEALTH PROVIDER, CHOICE 
LETTER SIGNED.  PT DENIES FURTHER DISCHARGE NEEDS.  
  
CM SPOKE TO OCTAVIANO OF CARE , DISCUSSED REFERRAL.  OCTAVIANO THINKS PT MAY BE WITHIN 
THE SERVICE AREA FOR [facility].  CM CALLED [facility], [phone], SPOKE TO CHELSIE 
AND PROVIDED REFERRAL INFORMATION.  CM FAXED REFERRAL FOR HOME HEALTH TO [facility] [phone].  
  
CM WAITING ACCEPTANCE DETERMINATION FROM [facility].  
  
: Carl Bojorquez  
  
Appended by Carl Bojorquez on 2019 17:22 CST:  
CM RECEIVED CALL FROM CHELSIE OF [facility], THEY WILL ACCEPT PT AND 
PLAN FOR HOME HEALTH ADMISSION ON SATURDAY, 19.  
  
FOR DISCHARGE, NOTIFY [facility] -[phone].  FAX DISCHARGE 
INFORMATION TO [facility] [phone].   
  
CARL BOJORQUEZ, CASE MANAGEMENT
 DCPIA - Discharge Planning Initial Assessment
 
Updated by [id number]: Carl Bojorquez on 19   1:37 pm
*  Is the patient Alert and Oriented?
Yes
*  How many steps to enter\exit or inside your home? NONE *  PCP VINICIUS BETHEA *  Pharmacy
[facility]
*  Preadmission Environment
Home with Family
*  ADLs
 
Independent
*  Equipment
Other
*  Other Equipment
BLOOD PRESSURE CUFF  NO MEDICAL EQUIPMENT PROVIDER PREFERENCE
 
*  List name and contact numbers for known caregivers / representatives who 
currently or will assist patient after discharge:
BAR ASHBY, MOTHER, [phone]
*  Verbal permission to speak to the caregivers and representatives has been 
obtained from the patient.
Yes
*  Community resources currently utilized
None
*  Please name any agencies selected above.
NONE
*  Additional services required to return to the preadmission environment?
Yes
*  Can the patient safely return to the preadmission environment?
Yes
*  Has this patient been hospitalized within the prior 30 days at any 
hospital?
No
 
 
 
 
 
Coverage Notice
 
Reviewer: [email or web address] Rhett Bojorquez
 
Notice Issued Date-Time: 2019  11:20
Notice Type: Patient Choice Letter
 
Notice Delivered To: Patient
Relationship to Patient: 
Representative Name: 
 
Delivery Method: HAND - Hand Delivered
Minnie Days:
Prior Verbal Notification: 
 
Recipient Understood Notice: Yes
Recipient Signature: Yes
Med Rec Note Co-signed by Attending:
 
Coverage Notice Comment:  NO HOME HEALTH PREFERENCE
 
Last DP export: 19   4:08 pm
Patient Name: MARU ASHBY
 
Encounter #: [id number]
Page [id number]
 
 
 
 
 
Electronically Signed by MAYELIN CRUZ on 19 at 1744
 
 
 
 
 
 
**All edits/amendments must be made on the electronic document**
 
DICTATION DATE: 19     : JUWAN  19     
RPT#: [id number]                                DC DATE:        
                                               STATUS: ADM IN  
[facility]
[address] [place] [address]
***END OF REPORT***

## 2019-02-07 NOTE — MORECARE
CASE MANAGEMENT DISCHARGE SUMMARY
 
 
PATIENT: MARU ASHBY              UNIT: [id number]
ACCOUNT#: [id number]                       ADM DATE: 19
AGE: 28     : [birth year]  SEX: M            ROOM/BED: D.2131    
AUTHOR: NANCY,DOC                             PHYSICIAN:                               
 
REFERRING PHYSICIAN: VINICIUS BETHEA MD                  
DATE OF SERVICE: 19
Discharge Plan
 
 
Patient Name: MARU ASHBY
Facility: [facility]:[place]
Encounter #: [id number]
Medical Record #: [id number]
: 1990
Planned Disposition: Home with Home Health
Anticipated Discharge Date: 19
 
Discharge Date: 2019
Expected LOS: 9
Initial Reviewer: [email or web address]
Initial Review Date: 2019
Generated: 19   2:07 pm 
Comments
 
DCP- Discharge Planning
 
Updated by [id number]: Génesis Bojorquez on 19   4:48 pm CT
Patient Name:  MARU ASHBY   
Encounter No:  [id number]   
:  1990   
Primary Insurance:  [employer] [place] PRIVATE OPTIONS CODI  
Anticipated DC Date: 2019   
Planned Disposition:  Home  
  
  
DCP follow-up note: PATIENT HAS RECEIVED NEW DIALYSIS CLINIC ARRANGEMENT FROM 
PATIENT PATHWAYS COORDINATOR, CM RECEIVED DISCHARGE ORDER.  BEDSIDE NURSE 
INFORMED BARTOLOME THAT PT NO LONGER NEEDS HOME HEALTH FOR WOUND CARE AND DRAIN HAS 
BEEN REMOVED.  BARTOLOME SPOKE TO PT AND MOTHER IN ROOM, BOTH DENIED DISCHARGE NEEDS,
 PT'S MOTHER TO TRANSPORT HOME TODAY.  BARTOLOME CALLED [facility] AND CANCELLED HOME 
HEALTH REFERRAL.  
  
SERJIO DEL CASTILLO
DCP- Discharge Planning
 
Updated by [id number]: Génesis Bojorquez on 19   4:43 pm CT
 
ORDER RECEIVED: to make arrangements for OP HD in [place].  
  
BARTOLOME spoke to Shonda ZULUAGA who stated the patient is going to be new to 
hemodialysis and his first dialysis will be today post hemosplit placement.  
She stated that the patient will need to go to [place] as that is the 
only facility close to where he lives.  Bartolome asked that she order the hepatitis 
panel and HIV.  She stated they no longer require HIV testing.  Hepatitis 
panel ordered.  Bartolome faxed Admission intake form, facesheet, H&P, CXR, 
Progress 
notes, all hospital labs, with pending hepatitis panel, medication list with 
allergies listed at the top to [facility].  Cm left Socorro Llamas a message but 
no return call as it is Saturday and she only works [employer].  No operative report 
to fax at this time in the chart. CM will continue to follow and will assist 
as needed with dc plans/needs.   
Chelsie Keating Rn, [facility]
DCP- Discharge Planning
 
Updated by [id number]: Génesis Bojorquez on 19   4:22 pm CT
Patient Name: MARU ASHBY                                     
Admission Status: Elective   
Accout number: [id number]                              
Admission Date: 2019   
: 1990                                                        
Admission Diagnosis:   
Attending: VINICIUS BETHEA                                                
Current LOS:  3   
  
Anticipated DC Date: 2019   
Planned Disposition: Home with Home Health   
Primary Insurance: [employer] [place] PRIVATE OPTIONS CODI   
PLANNED EXTERNAL PROVIDER:  [facility]  
  
Discharge Planning Comments:   
CM RECEIVED HOME HEALTH ORDER, MET WITH PT IN ROOM TO DISCUSS DISCHARGE 
PLANNING AND NEEDS.  PT REPORTS LIVING AT HOME INDEPENDENTLY WITH PARENTS AND 
ADULT SISTER.   PT HAS BLOOD PRESSURE MONITOR AND NO MEDICAL EQUIPMENT  
PROVIDER PREFERENCE.  PT HAS NO OUTSIDE SERVICES ASSISTING IN THE HOME. PT IS 
NOT YET DOING OUTPATIENT DIALYSIS, BUT HAD A FISTULA  CREATED FOR WHEN HE MAY 
NEED IT.  CM DISCUSSED AVAILABILITY OF HOME HEALTH, REHAB SERVICES AND 
MEDICAL EQUIPMENT. PT WILL ACCEPT HOME HEALTH, REPORTS H IS SISTER TO BE A 
TEACHABLE CAREGIVER IN HOME.  PT REPORTS HIS PARENTS WILL PICK HIM UP FOR 
DISCHARGE HOME TOMORROW. PT HAS NO PREFERENCE ON HOME HEALTH PROVIDER, CHOICE 
LETTER SIGNED.  PT DENIES FURTHER DISCHARGE NEEDS.  
  
CM SPOKE TO OCTAVIANO OF [facility], DISCUSSED REFERRAL.  OCTAVIANO THINKS PT MAY BE WITHIN 
THE SERVICE AREA FOR [facility].  CM CALLED [facility], [phone], SPOKE TO CHELSIE 
AND PROVIDED REFERRAL INFORMATION.  CM FAXED REFERRAL FOR HOME HEALTH TO [facility] [phone].  
  
CM WAITING ACCEPTANCE DETERMINATION FROM [facility].  
  
: Génesis Bojorquez  
 
  
Appended by Génesis Bojorquez on 2019 17:22 CST:  
CM RECEIVED CALL FROM CHELSIE OF [facility], THEY WILL ACCEPT PT AND 
PLAN FOR HOME HEALTH ADMISSION ON SATURDAY, 19.  
  
FOR DISCHARGE, NOTIFY [facility] -[phone].  FAX DISCHARGE 
INFORMATION TO [facility]  [phone].   
  
GÉNESIS BOJORQUEZ, CASE MANAGEMENT
 DCPIA - Discharge Planning Initial Assessment
 
Updated by [id number]: Génesis Bojorquez on 19   1:37 pm
*  Is the patient Alert and Oriented?
Yes
*  How many steps to enter\exit or inside your home? NONE *  PCP VINICIUS BETHEA *  Pharmacy
[facility]
*  Preadmission Environment
Home with Family
*  ADLs
Independent
*  Equipment
Other
*  Other Equipment
BLOOD PRESSURE CUFF  NO MEDICAL EQUIPMENT PROVIDER PREFERENCE
 
*  List name and contact numbers for known caregivers / representatives who 
currently or will assist patient after discharge:
BAR ASHBY, MOTHER, [phone]
*  Verbal permission to speak to the caregivers and representatives has been 
obtained from the patient.
Yes
*  Community resources currently utilized
None
*  Please name any agencies selected above.
NONE
*  Additional services required to return to the preadmission environment?
Yes
*  Can the patient safely return to the preadmission environment?
Yes
*  Has this patient been hospitalized within the prior 30 days at any 
hospital?
No
 
 
 
 
 
Coverage Notice
 
Reviewer: [email or web address] - Génesis Bojorquez
 
Notice Issued Date-Time: 2019  11:20
 
Notice Type: Patient Choice Letter
 
Notice Delivered To: Patient
Relationship to Patient: 
Representative Name: 
 
Delivery Method: HAND - Hand Delivered
Minnie Days:
Prior Verbal Notification: 
 
Recipient Understood Notice: Yes
Recipient Signature: Yes
Med Rec Note Co-signed by Attending:
 
Coverage Notice Comment:  NO HOME HEALTH PREFERENCE
 
Last DP export: 19   4:54 pm
Patient Name: MARU ASHBY
Encounter #: [id number]
Page [phone]
 
 
 
 
 
Electronically Signed by MAYELIN CRUZ on 19 at 1307
 
 
 
 
 
 
**All edits/amendments must be made on the electronic document**
 
DICTATION DATE: 19 1307     : JUWAN  19 1307     
RPT#: [id number]                                DC DATE:19
                                               STATUS: DIS IN  
[facility]
[address] [place] [address]
***END OF REPORT***

## 2019-02-15 NOTE — OP
PATIENT NAME:  MARU ASHBY                    MEDICAL RECORD: [id number]
:[birth year]                                             LOCATION:D.M2     D.1
                                                         ADMISSION DATE:19
SURGEON:  ALBERTO MEMBRENO MD            
 
 
DATE OF OPERATION:  2019
 
The patient was referred to me by Dr. Sarah Montana.  His attending physician
in the hospital is Dr. Pearson.
 
PREOPERATIVE DIAGNOSES:  Worsening renal function now with CKD V and requiring
initiation of dialysis.
 
POSTOPERATIVE DIAGNOSES:  Worsening renal function now with CKD V and requiring
initiation of dialysis.
 
OPERATION PERFORMED:  Insertion of a right internal jugular 19-cm HemoSplit
dialysis catheter using ultrasound as well as fluoroscopic guidance.
 
SURGEON:  Alberto Membreno MD
 
ANESTHESIA:  General with an LMA per Dr. Medrano and CRNA.
 
PREOPERATIVE NOTE:  Mr. Ashby is a 28-year-old white male patient with chronic
glomerulosclerosis and nephrotic syndrome.  He was operated last week for repair
of a symptomatic chronically incarcerated umbilical/ventral hernia, which was a
complex repair requiring mesh and also he had an AV fistula constructed. 
Postop, he has had some problems that has kept him in the hospital and had a
steady decline in his renal function and he is now going to start hemodialysis
tonight after I place a catheter.
 
DESCRIPTION OF PROCEDURE:  Under general anesthesia with an LMA per Dr. Medrano and
then a CRNA, the patient was placed in supine position, prepped and draped in
sterile manner.  The head was turned to the left and the patient positioned in a
Trendelenburg position.  Ultrasound was used to locate the right internal
jugular vein.  It was of normal caliber and fully compressible.  An incision was
made over at the base of the neck and through that incision, a micropuncture
needle and wire were inserted and subsequently wire and catheter exchanges led
up to passage of serial dilators over a 0.038 wire and lastly a dilator
peel-away sheath was inserted all under fluoroscopy.  I chose a 19-cm HemoSplit.
 Made an incision beneath the clavicle and pulled the HemoSplit from there
through a subcutaneous tunnel up to the cervical incision and inserted it then
through the peel-away sheath.  Fluoroscopy demonstrated satisfactory positioning
in the right atrium.  Both lumens of the catheter were accessed and aspirated,
free return of blood confirmed.  They were then flushed with saline and lastly
heparin locked, clamped and capped.  The Dacron felt cuff was positioned midway
in the tunnel between the exit site and the cervical wound.  The cervical
incision was closed with interrupted inverted 3-0 Vicryl and Dermabond glue and
dressed with Maxorb Ag, Tegaderm, and Cavilon skin prep.  The catheter entry
site was snugged up around the catheter with a single inverted subcuticular 3-0
Vicryl suture and the catheter was sutured to the skin near the entry site with
2-0 Prolene.  A chlorhexidine Biopatch was applied and a further sterile
standard CVL dressing over that.  The patient was awakened from his anesthetic
and taken to the recovery room in stable condition.
 
Blood loss was less than 5 cc, none was replaced.  Sponges, instruments, and
needles were accounted for.  No drain was used and no surgical specimen was
 
 
 
OPERATIVE REPORT                               [id number]    MARU ASHBY  
 
 
submitted for histopathology.
 
The patient is being returned to his room on med 2 now at about 4:30 in the
afternoon.  He will have dialysis this evening.
 
TRANSINT:[id number] Voice Confirmation ID: [id number] DOCUMENT ID: [id number]
                                           
                                           ALBERTO MEMBRENO MD            
 
 
 
Electronically Signed by ALBERTO MEMBRENO on 02/15/19 at 1337
 
 
 
 
 
 
 
 
 
 
 
 
 
 
 
 
 
 
 
 
 
 
 
 
 
 
 
 
 
 
 
 
 
 
 
CC: PEE PEARSON MD and SARAH MONTANA                [id number]
DICTATION DATE: 19 1622     :     19 0138      DIS IN  
                                                                      19
[address] [place] [address]

## 2019-02-15 NOTE — OP
PATIENT NAME:  MARU ASHBY                    MEDICAL RECORD: [id number]
:[birth year]                                             LOCATION:D.M2     D.2131
                                                         ADMISSION DATE:19
SURGEON:  ALBERTO MEMBRENO MD            
 
 
DATE OF OPERATION:  2019
 
REFERRING PHYSICIAN:  Sarah Montana MD
 
PREOPERATIVE DIAGNOSES:
1.  Chronically incarcerated umbilical hernia.
2.  Chronic kidney disease stage IV-V.
3.  Chronic nephrotic syndrome.
 
POSTOPERATIVE DIAGNOSES:
1.  Chronically incarcerated umbilical hernia.
2.  Chronic kidney disease stage IV-V.
3.  Chronic nephrotic syndrome.
 
OPERATIONS PERFORMED:
1.  Creation of left brachial artery to cephalic vein arteriovenous fistula.
2.  Open repair of chronically incarcerated umbilical hernia using a size large
Ventralex ST mesh placed intraperitoneally with resection of chronically
incarcerated omentum and hernia sac.
 
ANESTHESIA:  Regional block for the left arm and general anesthesia with LMA per
CRNA for the umbilical hernia.
 
SURGEON:  Alberto Membreno MD
 
PREOPERATIVE NOTE:  Mr. Ashby is a chronically ill 28-year-old white male,
patient of Dr. Sarah Montana.  The patient has hypertension and chronic
glomerulonephritis with nephrotic syndrome with history of hyperlipidemia and
deep vein thrombosis.  He also has symptomatic chronically incarcerated
umbilical hernia.  He is on steroids, prednisone 10 mg daily.  He is also
chronically anticoagulated with Xarelto, but is presently in heparin or Lovenox
window.  His last dose of Lovenox was yesterday evening.  He is brought to the
operating room now to create a preemptive fistula for dialysis access in the
near future and to repair the hernia.
 
I have discussed with the patient and his family the fact that he is at risk for
complications associated with wound healing, particularly of his hernia due to
his very low serum proteins.  He does not clinically have ascites thankfully.
 
Under anesthesia, the patient was prepped and draped in sterile manner.  I
examined him with ultrasound using a venous tourniquet and topical
nitroglycerin, and then decided to do brachiocephalic fistula.  Through a
transverse antecubital incision, I exposed the vein and dissected free of
surrounding structures.  It was ligated distally with Vicryl, transected,
bevelled, prepared for anastomosis, flushed with heparinized saline, and treated
with topical papaverine.  The brachial artery was exposed and controlled with
Silastic loops.  Also, it was treated with topical papaverine.  The artery was
opened and flushed proximally and distally with heparinized saline, after which
an end of vein to side of artery anastomosis was performed with continuous
running 7-0 Prolene.  The anastomosis was hemostatic.  Flow developed
immediately with release of the occluding loops and clamps.  Doppler flow
signals were excellent and there was preservation of an excellent radial artery
 
 
 
OPERATIVE REPORT                               [id number]    MARU ASHBY  
 
 
Doppler flow signal at the wrist.
 
The wound was closed with interrupted inverted 3-0 Vicryl and running
intracuticular 4-0 Monocryl.  Standard dressing applied.
 
The patient's abdomen had been prepped and draped earlier and it was then
exposed.  A curved transversely oriented incision was made beneath the umbilicus
and the incision was carried down to the anterior rectus sheath with
electrocautery.  The tissues were very edematous and bled and oozed
continuously.  The patient was given 20 mcg of DDAVP at this point to help
control the persistent oozing and this was very helpful.  The sac was resected. 
The neck of the hernia had about a 1.5-cm diameter.  The chronically
incarcerated omentum was then reduced, though portion was resected and ligated
with Vicryl due to some chronic inflammatory changes and fat necrosis.  I then
placed a size large Ventralex ST mesh into the abdominal cavity and pulled this
up against the anterior abdominal wall.  I made sure that there was no bowel
trapped between the mesh and the peritoneum.  The mesh was held up with straps
as the fascia was closed transversely with interrupted figure-of-eight #0 Vicryl
and some additional #0 Prolene sutures.  The straps and some of the underlying
mesh was incorporated in these sutures to hold it in position.  The excess strap
Marlex was cut away.  The wound was irrigated with saline and further closed
with interrupted inverted 3-0 Vicryl.  A 10-mm flat fluted drain was left at the
base of the incision, brought out to the left through a separate stab incision
and attached to suction.  Skin was closed with running intracuticular 4-0
Monocryl and Dermabond glue, and sterile dressing applied.  The patient was
awakened from his anesthetic and taken to the recovery room.
 
Blood loss during the operation was probably 20 cc, was unreplaced.  Sponges,
instruments, and needles were accounted for.  One drain was used as I described.
 The surgical specimens consisted of resected sac and omentum.
 
PLAN:  The patient will stay in the hospital as an inpatient so that we can keep
a close eye on the amount of wound drainage and be certain that he is receiving
his Lovenox starting again in the morning, that he has SCD compression boots on
this evening, begins early ambulation tomorrow.  His predinsone was never
discontinued.  
 
TRANSINT:[id number] Voice Confirmation ID: [id number] DOCUMENT ID: [id number]
                                           
                                           ALBERTO MEMBRENO MD            
 
 
 
Electronically Signed by ALBERTO MEMBRENO on 02/15/19 at 1337
 
 
 
CC: SARAH MONTANA                                            [id number]
DICTATION DATE: 19 1628     :     19 1832      DIS IN  
                                                                      19
[facility]                                          
[address] [place] [address]